# Patient Record
Sex: FEMALE | Race: WHITE | Employment: FULL TIME | URBAN - METROPOLITAN AREA
[De-identification: names, ages, dates, MRNs, and addresses within clinical notes are randomized per-mention and may not be internally consistent; named-entity substitution may affect disease eponyms.]

---

## 2017-01-21 ENCOUNTER — HOSPITAL ENCOUNTER (OUTPATIENT)
Dept: MAMMOGRAPHY | Age: 52
Discharge: HOME | End: 2017-01-21
Attending: OBSTETRICS & GYNECOLOGY
Payer: COMMERCIAL

## 2017-01-21 DIAGNOSIS — Z12.31 VISIT FOR SCREENING MAMMOGRAM: ICD-10-CM

## 2017-01-21 PROCEDURE — 77067 SCR MAMMO BI INCL CAD: CPT

## 2017-01-26 ENCOUNTER — OFFICE VISIT (OUTPATIENT)
Dept: NUTRITION/OBESITY MEDICINE | Facility: HOSPITAL | Age: 52
End: 2017-01-26
Attending: INTERNAL MEDICINE
Payer: COMMERCIAL

## 2017-01-26 VITALS
WEIGHT: 166.25 LBS | HEIGHT: 63 IN | DIASTOLIC BLOOD PRESSURE: 2 MMHG | BODY MASS INDEX: 29.46 KG/M2 | SYSTOLIC BLOOD PRESSURE: 122 MMHG

## 2017-01-26 DIAGNOSIS — E66.9 OBESITY (BMI 30-39.9): ICD-10-CM

## 2017-01-26 DIAGNOSIS — E53.8 LOW VITAMIN B12 LEVEL: ICD-10-CM

## 2017-01-26 DIAGNOSIS — E78.2 MIXED HYPERCHOLESTEROLEMIA AND HYPERTRIGLYCERIDEMIA: Primary | ICD-10-CM

## 2017-01-26 DIAGNOSIS — E55.9 VITAMIN D DEFICIENCY: ICD-10-CM

## 2017-01-26 DIAGNOSIS — Z51.81 ENCOUNTER FOR THERAPEUTIC DRUG MONITORING: ICD-10-CM

## 2017-01-26 DIAGNOSIS — R06.83 SNORING: ICD-10-CM

## 2017-01-26 PROCEDURE — 99214 OFFICE O/P EST MOD 30 MIN: CPT | Performed by: INTERNAL MEDICINE

## 2017-01-26 NOTE — PROGRESS NOTES
[address] [place] [address] AND WEIGHT LOSS CLINIC  [address] [place] [address]  Dept: [phone]  Loc: [phone]     Date:   2016    Patient:  Remi Kirk  :      3/16/1965  MRN:      [id number]    Chief 7/5/2016    REDUCTION LEFT      Comment 1982    REDUCTION RIGHT      Comment 1982    JASON BIOPSY STEREOTACTIC NODULE 2 SITE BILAT      Comment 2001     Family History:    Family History   Problem Relation Age of Onset   • Hypertension Brother        Food Bruce Craig General appearance: alert, appears stated age and cooperative  Head: Normocephalic, without obvious abnormality, atraumatic  Eyes: conjunctivae/corneas clear. PERRL, EOM's intact. Fundi benign. Back: symmetric, no curvature.  ROM normal. No CVA tendernes ounces of non-caloric beverages per day. No fruit juices or regular soda. 3. Increase activity-upper body exercises, walk 10 minutes per day. 4. Increase fruit and vegetable servings to 5-6 per day.       Appetite and energy levels improving with Vyvanse

## 2017-02-08 ENCOUNTER — TELEPHONE (OUTPATIENT)
Dept: SURGERY | Facility: CLINIC | Age: 52
End: 2017-02-08

## 2017-02-08 NOTE — TELEPHONE ENCOUNTER
Patient is taking Vyvanse and will be applying for a job with the [employer].  Will Vyvanse affect her drug test? Please advise

## 2017-03-07 ENCOUNTER — OFFICE VISIT (OUTPATIENT)
Dept: SURGERY | Facility: CLINIC | Age: 52
End: 2017-03-07
Attending: INTERNAL MEDICINE

## 2017-03-07 VITALS
DIASTOLIC BLOOD PRESSURE: 80 MMHG | HEIGHT: 62 IN | HEART RATE: 87 BPM | OXYGEN SATURATION: 97 % | BODY MASS INDEX: 30.61 KG/M2 | RESPIRATION RATE: 18 BRPM | SYSTOLIC BLOOD PRESSURE: 125 MMHG | WEIGHT: 166.31 LBS | TEMPERATURE: 99 F

## 2017-03-07 DIAGNOSIS — E55.9 VITAMIN D DEFICIENCY: ICD-10-CM

## 2017-03-07 DIAGNOSIS — E53.8 LOW VITAMIN B12 LEVEL: ICD-10-CM

## 2017-03-07 DIAGNOSIS — Z51.81 ENCOUNTER FOR THERAPEUTIC DRUG MONITORING: ICD-10-CM

## 2017-03-07 DIAGNOSIS — E66.9 OBESITY (BMI 30-39.9): ICD-10-CM

## 2017-03-07 DIAGNOSIS — E78.2 MIXED HYPERCHOLESTEROLEMIA AND HYPERTRIGLYCERIDEMIA: Primary | ICD-10-CM

## 2017-03-07 PROCEDURE — 99214 OFFICE O/P EST MOD 30 MIN: CPT | Performed by: INTERNAL MEDICINE

## 2017-03-07 NOTE — PROGRESS NOTES
[address] [place] [address] AND WEIGHT LOSS CLINIC  [address] [place] [address]  Dept: [phone]  Loc: [phone]     Date:   2016    Patient:  Bennie Mittal  :      3/16/1965  MRN:      [id number]    Chief REDUCTION LEFT      Comment 1982    REDUCTION RIGHT      Comment 1982    JASON BIOPSY STEREOTACTIC NODULE 2 SITE BILAT      Comment 2001     Family History:    Family History   Problem Relation Age of Onset   • Hypertension Brother        Food [employer]  · Rev appearance: alert, appears stated age and cooperative  Head: Normocephalic, without obvious abnormality, atraumatic  Eyes: conjunctivae/corneas clear. PERRL, EOM's intact. Fundi benign. Back: symmetric, no curvature. ROM normal. No CVA tenderness.   Lungs: beverages per day. No fruit juices or regular soda. 3. Increase activity-upper body exercises, walk 10 minutes per day. 4. Increase fruit and vegetable servings to 5-6 per day.       Appetite and energy levels improving with Vyvanse  Will continue Vyvanse

## 2017-03-18 ENCOUNTER — OFFICE VISIT (OUTPATIENT)
Dept: OTOLARYNGOLOGY | Facility: CLINIC | Age: 52
End: 2017-03-18

## 2017-03-18 VITALS — DIASTOLIC BLOOD PRESSURE: 80 MMHG | SYSTOLIC BLOOD PRESSURE: 110 MMHG | TEMPERATURE: 99 F

## 2017-03-18 DIAGNOSIS — H61.21 IMPACTED CERUMEN OF RIGHT EAR: ICD-10-CM

## 2017-03-18 DIAGNOSIS — E04.9 GOITER: Primary | ICD-10-CM

## 2017-03-18 PROCEDURE — 69210 REMOVE IMPACTED EAR WAX UNI: CPT | Performed by: OTOLARYNGOLOGY

## 2017-03-18 PROCEDURE — 99213 OFFICE O/P EST LOW 20 MIN: CPT | Performed by: OTOLARYNGOLOGY

## 2017-03-18 RX ORDER — LISDEXAMFETAMINE DIMESYLATE 20 MG/1
CAPSULE ORAL
COMMUNITY
Start: 2016-12-27 | End: 2017-04-27 | Stop reason: DRUGHIGH

## 2017-03-18 NOTE — PROGRESS NOTES
Nikita Nava is a 45year old female. Patient presents with:  Ear Problem: Possible perforation R eardrum. Pt reports pain and difficulty hearing R ear.         HISTORY OF PRESENT ILLNESS  3/18/2017    Here for evaluation of a plugged ear on the right RIGHT      Comment 1982    [facility] BIOPSY STEREOTACTIC NODULE 2 SITE BILAT      Comment 2001         REVIEW OF SYSTEMS    System Neg/Pos Details   Constitutional Negative fever, weight loss.    ENMT Negative Headaches vertigo    Eyes Negative Blurred vision and All questions were answered.       Current outpatient prescriptions:   •  VYVANSE 20 MG Oral Cap, , Disp: , Rfl:   •  [START ON 4/7/2017] Lisdexamfetamine Dimesylate (VYVANSE) 30 MG Oral Cap, Take 1 capsule (30 mg total) by mouth every morning., Disp: 30 c

## 2017-03-18 NOTE — PATIENT INSTRUCTIONS
Earwax Removal    The ear canal makes earwax from the canal’s lining. The ears make wax to lubricate and protect the ear canal. The ear canal is the tube that connects the middle ear to the outside of the ear.  The wax protects the ear from bacteria, infe · Don’t use cotton swabs in your ears. Cotton swabs may push wax deeper into the ear canal or damage the eardrum.  Use cotton gauze or a wet washcloth  to gently remove wax on the outside of the ear and around the opening to the ear canal.  · Don't use any © 5695-4561 [address], [address]. All rights reserved. This information is not intended as a substitute for professional medical care. Always follow your healthcare professional's instructions.

## 2017-04-01 ENCOUNTER — HOSPITAL ENCOUNTER (OUTPATIENT)
Dept: ULTRASOUND IMAGING | Age: 52
Discharge: HOME | End: 2017-04-01
Attending: OTOLARYNGOLOGY
Payer: COMMERCIAL

## 2017-04-01 DIAGNOSIS — E04.9 GOITER: ICD-10-CM

## 2017-04-01 PROCEDURE — 76536 US EXAM OF HEAD AND NECK: CPT

## 2017-04-27 ENCOUNTER — OFFICE VISIT (OUTPATIENT)
Dept: SURGERY | Facility: CLINIC | Age: 52
End: 2017-04-27

## 2017-04-27 VITALS
SYSTOLIC BLOOD PRESSURE: 122 MMHG | HEIGHT: 62 IN | DIASTOLIC BLOOD PRESSURE: 70 MMHG | BODY MASS INDEX: 30.09 KG/M2 | RESPIRATION RATE: 18 BRPM | WEIGHT: 163.5 LBS

## 2017-04-27 DIAGNOSIS — E55.9 VITAMIN D DEFICIENCY: ICD-10-CM

## 2017-04-27 DIAGNOSIS — E53.8 LOW VITAMIN B12 LEVEL: ICD-10-CM

## 2017-04-27 DIAGNOSIS — E78.2 MIXED HYPERCHOLESTEROLEMIA AND HYPERTRIGLYCERIDEMIA: Primary | ICD-10-CM

## 2017-04-27 DIAGNOSIS — Z51.81 ENCOUNTER FOR THERAPEUTIC DRUG MONITORING: ICD-10-CM

## 2017-04-27 DIAGNOSIS — E66.3 OVERWEIGHT (BMI 25.0-29.9): ICD-10-CM

## 2017-04-27 PROCEDURE — 99214 OFFICE O/P EST MOD 30 MIN: CPT | Performed by: INTERNAL MEDICINE

## 2017-04-27 NOTE — PROGRESS NOTES
[address] [facility] AND [facility] LOSS CLINIC  [address] [place] [address]  Dept: [phone]  Loc: [phone]     Date:   2016    Patient:  Trenton Ramirez  :      3/16/1965  MRN:      [id number]    Chief INCISION AND DRAINAGE  7/5/2016    REDUCTION LEFT      Comment 1982    REDUCTION RIGHT      Comment 1982    JASON BIOPSY STEREOTACTIC NODULE 2 SITE BILAT      Comment 2001     Family History:    Family History   Problem Relation Age of Onset   • Hypertension negative    Physical Exam:   General appearance: alert, appears stated age and cooperative  Head: Normocephalic, without obvious abnormality, atraumatic  Eyes: conjunctivae/corneas clear. PERRL, EOM's intact. Fundi benign. Back: symmetric, no curvature.  R log.  2. Drink 48-64 ounces of non-caloric beverages per day. No fruit juices or regular soda. 3. Increase activity-upper body exercises, walk 10 minutes per day. 4. Increase fruit and vegetable servings to 5-6 per day.       Appetite and energy levels im

## 2017-05-13 ENCOUNTER — OFFICE VISIT (OUTPATIENT)
Dept: INTERNAL MEDICINE CLINIC | Facility: CLINIC | Age: 52
End: 2017-05-13

## 2017-05-13 VITALS
WEIGHT: 164 LBS | HEIGHT: 62 IN | TEMPERATURE: 98 F | SYSTOLIC BLOOD PRESSURE: 142 MMHG | DIASTOLIC BLOOD PRESSURE: 88 MMHG | HEART RATE: 97 BPM | BODY MASS INDEX: 30.18 KG/M2

## 2017-05-13 DIAGNOSIS — J01.90 ACUTE NON-RECURRENT SINUSITIS, UNSPECIFIED LOCATION: Primary | ICD-10-CM

## 2017-05-13 PROCEDURE — 99213 OFFICE O/P EST LOW 20 MIN: CPT | Performed by: INTERNAL MEDICINE

## 2017-05-13 PROCEDURE — 99212 OFFICE O/P EST SF 10 MIN: CPT | Performed by: INTERNAL MEDICINE

## 2017-05-13 RX ORDER — FLUTICASONE PROPIONATE 50 MCG
2 SPRAY, SUSPENSION (ML) NASAL DAILY
Qty: 1 BOTTLE | Refills: 3 | Status: SHIPPED | OUTPATIENT
Start: 2017-05-13 | End: 2017-10-09 | Stop reason: ALTCHOICE

## 2017-05-13 RX ORDER — AMOXICILLIN 875 MG/1
875 TABLET, COATED ORAL 2 TIMES DAILY
Qty: 20 TABLET | Refills: 0 | Status: SHIPPED | OUTPATIENT
Start: 2017-05-13 | End: 2017-05-23

## 2017-05-13 NOTE — PROGRESS NOTES
HPI:    Patient ID: Bennie Mittal is a 45year old female. Sinus Problem  This is a new problem. The current episode started 1 to 4 weeks ago. There has been no fever. The pain is moderate.  Associated symptoms include congestion, coughing, sinus pr (74.39 kg)         Body mass index is 29.99 kg/(m^2). \  PHYSICAL EXAM:   Physical Exam   Constitutional: She appears well-developed. No distress. HENT:   Head: Normocephalic.    Right Ear: External ear normal.   Left Ear: External ear normal.   Maria D

## 2017-06-22 ENCOUNTER — OFFICE VISIT (OUTPATIENT)
Dept: SURGERY | Facility: CLINIC | Age: 52
End: 2017-06-22

## 2017-06-22 VITALS
HEIGHT: 62 IN | SYSTOLIC BLOOD PRESSURE: 128 MMHG | DIASTOLIC BLOOD PRESSURE: 80 MMHG | WEIGHT: 165.31 LBS | BODY MASS INDEX: 30.42 KG/M2

## 2017-06-22 DIAGNOSIS — E55.9 VITAMIN D DEFICIENCY: ICD-10-CM

## 2017-06-22 DIAGNOSIS — Z51.81 ENCOUNTER FOR THERAPEUTIC DRUG MONITORING: ICD-10-CM

## 2017-06-22 DIAGNOSIS — E53.8 LOW VITAMIN B12 LEVEL: ICD-10-CM

## 2017-06-22 DIAGNOSIS — E66.9 OBESITY (BMI 30-39.9): ICD-10-CM

## 2017-06-22 DIAGNOSIS — E78.2 MIXED HYPERCHOLESTEROLEMIA AND HYPERTRIGLYCERIDEMIA: Primary | ICD-10-CM

## 2017-06-22 PROCEDURE — 99214 OFFICE O/P EST MOD 30 MIN: CPT | Performed by: INTERNAL MEDICINE

## 2017-06-22 NOTE — PROGRESS NOTES
[address] [facility] AND [facility] CLINIC  [address] [place] [address]  Dept: [phone]  Loc: [phone]     Date:   2016    Patient:  Naman Montaño  :      3/16/1965  MRN:      [id number]    Chief Concern Yes    Comment: Coffee, 2 cups per day      Social History Narrative     Surgical History:        Past Surgical History    INCISION AND DRAINAGE  7/5/2016    REDUCTION LEFT      Comment [address] [place] [address] BIOPSY STEREO negative  Musculoskeletal:negative  Neurological: negative  Behavioral/Psych: negative  Endocrine: negative  All other systems were reviewed and are negative    Physical Exam:   General appearance: alert, appears stated age and cooperative  Head: Normoceph warranted. Further consideration for obtaining the sleep study will be discussed with the patient's PCP. Goals for next month:  1. Keep a food log. 2. Drink 48-64 ounces of non-caloric beverages per day. No fruit juices or regular soda.   3. Increase act

## 2017-08-23 ENCOUNTER — OFFICE VISIT (OUTPATIENT)
Dept: SURGERY | Facility: CLINIC | Age: 52
End: 2017-08-23

## 2017-08-23 VITALS
BODY MASS INDEX: 30.36 KG/M2 | SYSTOLIC BLOOD PRESSURE: 130 MMHG | HEIGHT: 62 IN | DIASTOLIC BLOOD PRESSURE: 80 MMHG | WEIGHT: 165 LBS

## 2017-08-23 DIAGNOSIS — Z51.81 ENCOUNTER FOR THERAPEUTIC DRUG MONITORING: ICD-10-CM

## 2017-08-23 DIAGNOSIS — E66.9 OBESITY (BMI 30-39.9): ICD-10-CM

## 2017-08-23 DIAGNOSIS — R06.83 SNORING: Primary | ICD-10-CM

## 2017-08-23 DIAGNOSIS — E55.9 VITAMIN D DEFICIENCY: ICD-10-CM

## 2017-08-23 PROCEDURE — 99214 OFFICE O/P EST MOD 30 MIN: CPT | Performed by: INTERNAL MEDICINE

## 2017-08-23 RX ORDER — HYDROCHLOROTHIAZIDE 12.5 MG/1
12.5 CAPSULE, GELATIN COATED ORAL DAILY
Qty: 30 CAPSULE | Refills: 1 | Status: SHIPPED | OUTPATIENT
Start: 2017-08-23 | End: 2017-10-09 | Stop reason: ALTCHOICE

## 2017-08-23 RX ORDER — LISDEXAMFETAMINE DIMESYLATE 40 MG/1
CAPSULE ORAL
COMMUNITY
Start: 2017-07-22 | End: 2017-10-09 | Stop reason: ALTCHOICE

## 2017-08-23 NOTE — PROGRESS NOTES
[address] [facility] AND [facility] LOSS CLINIC  [address] [place] [address]  Dept: [phone]  Loc: [phone]     Date:   2016    Patient:  Snehal Aguirre  :      3/16/1965  MRN:      [id number]    Chief Surgical History:  7/5/2016: INCISION AND DRAINAGE  No date: JASON BIOPSY STEREOTACTIC NODULE 2 SITE BILAT      Comment: 2001  No date: REDUCTION LEFT      Comment: 1982  No date: REDUCTION RIGHT      Comment: 1982  Family History:    Family History   Proble other systems were reviewed and are negative    Physical Exam:   General appearance: alert, appears stated age and cooperative  Head: Normocephalic, without obvious abnormality, atraumatic  Eyes: conjunctivae/corneas clear. PERRL, EOM's intact.  Fundi benig ounces of non-caloric beverages per day. No fruit juices or regular soda. 3. Increase activity-upper body exercises, walk 10 minutes per day. 4. Increase fruit and vegetable servings to 5-6 per day.       Appetite and energy levels improving with Vyvanse

## 2017-10-09 ENCOUNTER — OFFICE VISIT (OUTPATIENT)
Dept: SURGERY | Facility: CLINIC | Age: 52
End: 2017-10-09

## 2017-10-09 VITALS
WEIGHT: 165.31 LBS | SYSTOLIC BLOOD PRESSURE: 140 MMHG | BODY MASS INDEX: 30.42 KG/M2 | DIASTOLIC BLOOD PRESSURE: 78 MMHG | HEIGHT: 62 IN

## 2017-10-09 DIAGNOSIS — R53.83 OTHER FATIGUE: ICD-10-CM

## 2017-10-09 DIAGNOSIS — R63.2 BINGE EATING: Primary | ICD-10-CM

## 2017-10-09 DIAGNOSIS — E66.9 OBESITY (BMI 30-39.9): ICD-10-CM

## 2017-10-09 DIAGNOSIS — Z51.81 ENCOUNTER FOR THERAPEUTIC DRUG MONITORING: ICD-10-CM

## 2017-10-09 DIAGNOSIS — F43.9 STRESS: ICD-10-CM

## 2017-10-09 DIAGNOSIS — E78.2 MIXED HYPERCHOLESTEROLEMIA AND HYPERTRIGLYCERIDEMIA: ICD-10-CM

## 2017-10-09 PROCEDURE — 99214 OFFICE O/P EST MOD 30 MIN: CPT | Performed by: INTERNAL MEDICINE

## 2017-10-09 NOTE — PROGRESS NOTES
[address] [facility] AND WEIGHT LOSS CLINIC  [address] [place] [address]  Dept: [phone]  Loc: [phone]     Date:   10/9/17    Patient:  Nikita Nava  :      3/16/1965  MRN:      [id number]    Chief Co tobacco: Not on file    Alcohol use Yes  0.0 oz/week     Comment: Socially    Drug use: Unknown     Other Topics Concern    Caffeine Concern Yes    Comment: Coffee, 2 cups per day      Social History Narrative   None on file     Surgical History:    Past S and Discussed    Walk for  45 Minutes and Treadmill for  45 Minutes    ROS:    Constitutional: negative  Respiratory: negative  Cardiovascular: negative  Gastrointestinal: negative  Musculoskeletal:negative  Neurological: negative  Behavioral/Psych: negati Drink 48-64 ounces of non-caloric beverages per day. No fruit juices or regular soda. 3. Increase activity-upper body exercises, walk 10 minutes per day. 4. Increase fruit and vegetable servings to 5-6 per day.       Appetite and energy levels improving w

## 2017-11-07 ENCOUNTER — OFFICE VISIT (OUTPATIENT)
Dept: INTERNAL MEDICINE CLINIC | Facility: CLINIC | Age: 52
End: 2017-11-07

## 2017-11-07 VITALS
DIASTOLIC BLOOD PRESSURE: 104 MMHG | HEART RATE: 118 BPM | WEIGHT: 164.31 LBS | TEMPERATURE: 99 F | BODY MASS INDEX: 30 KG/M2 | SYSTOLIC BLOOD PRESSURE: 146 MMHG

## 2017-11-07 DIAGNOSIS — J02.9 PHARYNGITIS, UNSPECIFIED ETIOLOGY: Primary | ICD-10-CM

## 2017-11-07 PROCEDURE — 99213 OFFICE O/P EST LOW 20 MIN: CPT | Performed by: INTERNAL MEDICINE

## 2017-11-07 PROCEDURE — 99214 OFFICE O/P EST MOD 30 MIN: CPT | Performed by: INTERNAL MEDICINE

## 2017-11-07 PROCEDURE — 87880 STREP A ASSAY W/OPTIC: CPT | Performed by: INTERNAL MEDICINE

## 2017-11-07 NOTE — PROGRESS NOTES
HPI:    Patient ID: Andreia Banks is a 45year old female. Sore Throat    This is a new problem. The current episode started yesterday. There has been no fever. Pain severity now: Kept the pt up last night.  Associated symptoms include coughing (dry distress. HENT:   Head: Normocephalic and atraumatic.    Right Ear: Tympanic membrane, external ear and ear canal normal.   Left Ear: Tympanic membrane, external ear and ear canal normal.   Mouth/Throat: Oropharynx is clear and moist. No oropharyngeal exu applicable) and agree that the record reflects my personal performance and is accurate and complete.   Alley Alonso MD, 11/7/2017, 1:09 PM

## 2017-12-11 ENCOUNTER — OFFICE VISIT (OUTPATIENT)
Dept: SURGERY | Facility: CLINIC | Age: 52
End: 2017-12-11

## 2017-12-11 VITALS
OXYGEN SATURATION: 98 % | TEMPERATURE: 98 F | DIASTOLIC BLOOD PRESSURE: 84 MMHG | BODY MASS INDEX: 30.58 KG/M2 | RESPIRATION RATE: 18 BRPM | HEART RATE: 97 BPM | WEIGHT: 166.19 LBS | SYSTOLIC BLOOD PRESSURE: 120 MMHG | HEIGHT: 62 IN

## 2017-12-11 DIAGNOSIS — F43.9 STRESS: ICD-10-CM

## 2017-12-11 DIAGNOSIS — Z51.81 ENCOUNTER FOR THERAPEUTIC DRUG MONITORING: ICD-10-CM

## 2017-12-11 DIAGNOSIS — R06.83 SNORING: ICD-10-CM

## 2017-12-11 DIAGNOSIS — E66.3 OVERWEIGHT (BMI 25.0-29.9): ICD-10-CM

## 2017-12-11 DIAGNOSIS — E78.2 MIXED HYPERCHOLESTEROLEMIA AND HYPERTRIGLYCERIDEMIA: Primary | ICD-10-CM

## 2017-12-11 DIAGNOSIS — E55.9 VITAMIN D DEFICIENCY: ICD-10-CM

## 2017-12-11 PROCEDURE — 99214 OFFICE O/P EST MOD 30 MIN: CPT | Performed by: INTERNAL MEDICINE

## 2017-12-11 NOTE — PROGRESS NOTES
[address] [facility] AND [facility] LOSS CLIN[facility]  [address] [place] [address]  Dept: [phone]  Loc: [phone]     Date:   10/9/17    Patient:  Db Ruby  :      3/16/1965  MRN:      [id number]    Chief Co INCISION AND DRAINAGE  No date: JASON BIOPSY STEREOTACTIC NODULE 2 SITE BILAT      Comment: 2001  No date: REDUCTION LEFT      Comment: 1982  No date: REDUCTION RIGHT      Comment: 16  Family History:    Family History   Problem Relation Age of Onset   • H and are negative    Physical Exam:   General appearance: alert, appears stated age and cooperative  Head: Normocephalic, without obvious abnormality, atraumatic  Eyes: conjunctivae/corneas clear. PERRL, EOM's intact. Fundi benign.   Back: symmetric, no curv mg every other day    Feels that her clothes are fitting better    Patient is frustrated with weight loss    Recommended seeing Psychologist for stress/worry  Does not want to start meds  Admits to not sleeping well    Needs to work on sleep hygiene    Car

## 2018-01-27 ENCOUNTER — HOSPITAL ENCOUNTER (OUTPATIENT)
Dept: MAMMOGRAPHY | Age: 53
Discharge: HOME | End: 2018-01-27
Attending: OBSTETRICS & GYNECOLOGY
Payer: COMMERCIAL

## 2018-01-27 DIAGNOSIS — Z12.31 ENCOUNTER FOR SCREENING MAMMOGRAM FOR MALIGNANT NEOPLASM OF BREAST: ICD-10-CM

## 2018-01-27 PROCEDURE — 77067 SCR MAMMO BI INCL CAD: CPT | Performed by: OBSTETRICS & GYNECOLOGY

## 2019-02-09 ENCOUNTER — HOSPITAL ENCOUNTER (OUTPATIENT)
Dept: MAMMOGRAPHY | Age: 54
Discharge: HOME | End: 2019-02-09
Attending: OBSTETRICS & GYNECOLOGY
Payer: COMMERCIAL

## 2019-02-09 DIAGNOSIS — Z12.39 ENCOUNTER FOR SCREENING FOR MALIGNANT NEOPLASM OF BREAST: ICD-10-CM

## 2019-02-09 PROCEDURE — 77063 BREAST TOMOSYNTHESIS BI: CPT | Performed by: OBSTETRICS & GYNECOLOGY

## 2019-02-09 PROCEDURE — 77067 SCR MAMMO BI INCL CAD: CPT | Performed by: OBSTETRICS & GYNECOLOGY

## 2020-03-01 ENCOUNTER — HOSPITAL ENCOUNTER (OUTPATIENT)
Dept: MAMMOGRAPHY | Facility: HOSPITAL | Age: 55
Discharge: HOME | End: 2020-03-01
Attending: OBSTETRICS & GYNECOLOGY
Payer: COMMERCIAL

## 2020-03-01 DIAGNOSIS — Z12.31 ENCOUNTER FOR SCREENING MAMMOGRAM FOR MALIGNANT NEOPLASM OF BREAST: ICD-10-CM

## 2020-03-01 PROCEDURE — 77067 SCR MAMMO BI INCL CAD: CPT | Performed by: OBSTETRICS & GYNECOLOGY

## 2020-03-01 PROCEDURE — 77063 BREAST TOMOSYNTHESIS BI: CPT | Performed by: OBSTETRICS & GYNECOLOGY

## 2021-03-03 ENCOUNTER — HOSPITAL ENCOUNTER (OUTPATIENT)
Dept: MAMMOGRAPHY | Age: 56
Discharge: HOME | End: 2021-03-03
Attending: OBSTETRICS & GYNECOLOGY
Payer: COMMERCIAL

## 2021-03-03 DIAGNOSIS — Z12.31 ENCOUNTER FOR SCREENING MAMMOGRAM FOR MALIGNANT NEOPLASM OF BREAST: ICD-10-CM

## 2021-03-03 PROCEDURE — 77063 BREAST TOMOSYNTHESIS BI: CPT | Performed by: OBSTETRICS & GYNECOLOGY

## 2021-03-03 PROCEDURE — 77067 SCR MAMMO BI INCL CAD: CPT | Performed by: OBSTETRICS & GYNECOLOGY

## 2021-09-21 ENCOUNTER — TELEPHONE (OUTPATIENT)
Dept: SURGERY | Facility: CLINIC | Age: 56
End: 2021-09-21

## 2022-02-17 ENCOUNTER — LAB REQUISITION (OUTPATIENT)
Dept: LAB | Facility: HOSPITAL | Age: 57
End: 2022-02-17
Payer: COMMERCIAL

## 2022-02-17 PROCEDURE — 87624 HPV HI-RISK TYP POOLED RSLT: CPT | Performed by: OBSTETRICS & GYNECOLOGY

## 2022-02-17 PROCEDURE — 88175 CYTOPATH C/V AUTO FLUID REDO: CPT | Performed by: OBSTETRICS & GYNECOLOGY

## 2022-02-18 LAB — HPV I/H RISK 1 DNA SPEC QL NAA+PROBE: NEGATIVE

## 2022-03-12 ENCOUNTER — HOSPITAL ENCOUNTER (OUTPATIENT)
Dept: MAMMOGRAPHY | Age: 57
Discharge: HOME | End: 2022-03-12
Attending: OBSTETRICS & GYNECOLOGY
Payer: COMMERCIAL

## 2022-03-12 DIAGNOSIS — Z12.31 ENCOUNTER FOR SCREENING MAMMOGRAM FOR MALIGNANT NEOPLASM OF BREAST: ICD-10-CM

## 2022-03-12 PROCEDURE — 77063 BREAST TOMOSYNTHESIS BI: CPT | Performed by: OBSTETRICS & GYNECOLOGY

## 2022-03-12 PROCEDURE — 77067 SCR MAMMO BI INCL CAD: CPT | Performed by: OBSTETRICS & GYNECOLOGY

## 2023-01-02 ENCOUNTER — HOSPITAL ENCOUNTER (OUTPATIENT)
Age: 58
Discharge: HOME | End: 2023-01-02
Payer: COMMERCIAL

## 2023-01-02 VITALS
DIASTOLIC BLOOD PRESSURE: 88 MMHG | HEART RATE: 89 BPM | RESPIRATION RATE: 18 BRPM | SYSTOLIC BLOOD PRESSURE: 148 MMHG | BODY MASS INDEX: 27.6 KG/M2 | HEIGHT: 62 IN | OXYGEN SATURATION: 99 % | WEIGHT: 150 LBS | TEMPERATURE: 98 F

## 2023-01-02 DIAGNOSIS — N30.01 ACUTE CYSTITIS WITH HEMATURIA: Primary | ICD-10-CM

## 2023-01-02 LAB
BILIRUB UR QL STRIP: NEGATIVE
COLOR UR: YELLOW
GLUCOSE UR STRIP-MCNC: NEGATIVE MG/DL
KETONES UR STRIP-MCNC: NEGATIVE MG/DL
NITRITE UR QL STRIP: NEGATIVE
PH UR STRIP: 6.5 [PH]
PROT UR STRIP-MCNC: NEGATIVE MG/DL
SP GR UR STRIP: 1.01
UROBILINOGEN UR STRIP-ACNC: <2 MG/DL

## 2023-01-02 PROCEDURE — 99203 OFFICE O/P NEW LOW 30 MIN: CPT

## 2023-01-02 PROCEDURE — 81002 URINALYSIS NONAUTO W/O SCOPE: CPT

## 2023-01-02 RX ORDER — ROSUVASTATIN CALCIUM 20 MG/1
TABLET, COATED ORAL
COMMUNITY
Start: 2022-12-05

## 2023-01-02 RX ORDER — CEPHALEXIN 500 MG/1
500 CAPSULE ORAL 2 TIMES DAILY
Qty: 20 CAPSULE | Refills: 0 | Status: SHIPPED | OUTPATIENT
Start: 2023-01-02 | End: 2023-01-04 | Stop reason: ALTCHOICE

## 2023-01-02 NOTE — DISCHARGE INSTRUCTIONS
Your urine showed signs of a urinary tract infection. Please take the antibiotics as prescribed. We will send the urine for culture and call you in 2 days if the antibiotic needs to change. Drink lots of fluids. Take Tylenol or Motrin for pain or fever. You can take a probiotic to prevent yeast infections. If you develop any abdominal pain, back pain, vomiting or any other concerning complaints you should go to the emergency department. Otherwise follow-up with your primary care doctor.

## 2023-01-03 LAB
C TRACH DNA SPEC QL NAA+PROBE: NEGATIVE
N GONORRHOEA DNA SPEC QL NAA+PROBE: NEGATIVE

## 2023-01-04 ENCOUNTER — PATIENT MESSAGE (OUTPATIENT)
Dept: INTERNAL MEDICINE CLINIC | Facility: CLINIC | Age: 58
End: 2023-01-04

## 2023-01-04 ENCOUNTER — PATIENT MESSAGE (OUTPATIENT)
Dept: OBGYN CLINIC | Facility: CLINIC | Age: 58
End: 2023-01-04

## 2023-01-04 RX ORDER — NITROFURANTOIN 25; 75 MG/1; MG/1
100 CAPSULE ORAL 2 TIMES DAILY
Qty: 14 CAPSULE | Refills: 0 | Status: SHIPPED | OUTPATIENT
Start: 2023-01-04 | End: 2023-01-11

## 2023-01-04 NOTE — TELEPHONE ENCOUNTER
From: Diane Whipple  To: Anika Burks MD  Sent: 1/4/2023 1:55 PM CST  Subject: Question regarding URINE CULTURE, ROUTINE    Do I need to change my Rx?

## 2023-01-05 NOTE — TELEPHONE ENCOUNTER
Please find out what antibiotic she is actually taking. Initially given keflex then switched to nitrofurantoin but not sure if she was made aware of the switch.

## 2023-01-05 NOTE — TELEPHONE ENCOUNTER
From: Andrew Miles  To: Jan Mcfadden MD  Sent: 1/4/2023 1:55 PM CST  Subject: Question regarding URINE CULTURE, ROUTINE    Do I need to change my Rx?
It looks like the ordering provider already reviewed the culture results and switched her to Suzanna Garcia. Please be sure that she saw that change.
Pt was seen at [facility] for UTI symptoms on 1/2. Pt was prescribed cephalexin. Message to [facility] please [facility] urine culture and if RX needs to be changed?
dependent (less than 25% patients effort)

## 2023-02-20 ENCOUNTER — OFFICE VISIT (OUTPATIENT)
Dept: OBGYN CLINIC | Facility: CLINIC | Age: 58
End: 2023-02-20

## 2023-02-20 VITALS
HEART RATE: 89 BPM | WEIGHT: 150.19 LBS | BODY MASS INDEX: 28.35 KG/M2 | HEIGHT: 61 IN | DIASTOLIC BLOOD PRESSURE: 78 MMHG | SYSTOLIC BLOOD PRESSURE: 116 MMHG

## 2023-02-20 DIAGNOSIS — Z12.31 ENCOUNTER FOR SCREENING MAMMOGRAM FOR MALIGNANT NEOPLASM OF BREAST: ICD-10-CM

## 2023-02-20 DIAGNOSIS — Z12.4 SCREENING FOR MALIGNANT NEOPLASM OF CERVIX: ICD-10-CM

## 2023-02-20 DIAGNOSIS — Z01.419 WELL WOMAN EXAM WITH ROUTINE GYNECOLOGICAL EXAM: Primary | ICD-10-CM

## 2023-02-20 PROCEDURE — 99386 PREV VISIT NEW AGE 40-64: CPT | Performed by: OBSTETRICS & GYNECOLOGY

## 2023-02-20 PROCEDURE — 3078F DIAST BP <80 MM HG: CPT | Performed by: OBSTETRICS & GYNECOLOGY

## 2023-02-20 PROCEDURE — 3074F SYST BP LT 130 MM HG: CPT | Performed by: OBSTETRICS & GYNECOLOGY

## 2023-02-20 PROCEDURE — 3008F BODY MASS INDEX DOCD: CPT | Performed by: OBSTETRICS & GYNECOLOGY

## 2023-02-20 RX ORDER — LISINOPRIL 30 MG/1
TABLET ORAL
COMMUNITY
Start: 2022-12-20

## 2023-02-21 LAB — HPV I/H RISK 1 DNA SPEC QL NAA+PROBE: NEGATIVE

## 2023-02-22 ENCOUNTER — PATIENT OUTREACH (OUTPATIENT)
Dept: CASE MANAGEMENT | Age: 58
End: 2023-02-22

## 2023-02-22 NOTE — PROCEDURES
The office order for PCP request is Approved and finalized on February 22, 2023.     Thanks,  [facility] [employer]

## 2023-03-03 ENCOUNTER — OFFICE VISIT (OUTPATIENT)
Dept: INTERNAL MEDICINE CLINIC | Facility: CLINIC | Age: 58
End: 2023-03-03

## 2023-03-03 VITALS
HEIGHT: 61 IN | BODY MASS INDEX: 28.7 KG/M2 | WEIGHT: 152 LBS | DIASTOLIC BLOOD PRESSURE: 85 MMHG | SYSTOLIC BLOOD PRESSURE: 127 MMHG | HEART RATE: 76 BPM

## 2023-03-03 DIAGNOSIS — E78.2 MIXED HYPERLIPIDEMIA: ICD-10-CM

## 2023-03-03 DIAGNOSIS — I10 PRIMARY HYPERTENSION: ICD-10-CM

## 2023-03-03 DIAGNOSIS — Z23 NEED FOR VACCINATION: ICD-10-CM

## 2023-03-03 DIAGNOSIS — Z13.6 SCREENING, ISCHEMIC HEART DISEASE: ICD-10-CM

## 2023-03-03 DIAGNOSIS — Z00.00 ANNUAL PHYSICAL EXAM: Primary | ICD-10-CM

## 2023-03-03 DIAGNOSIS — E55.9 VITAMIN D DEFICIENCY: ICD-10-CM

## 2023-03-03 DIAGNOSIS — Z23 NEED FOR TDAP VACCINATION: ICD-10-CM

## 2023-03-03 PROCEDURE — 3074F SYST BP LT 130 MM HG: CPT | Performed by: INTERNAL MEDICINE

## 2023-03-03 PROCEDURE — 90471 IMMUNIZATION ADMIN: CPT | Performed by: INTERNAL MEDICINE

## 2023-03-03 PROCEDURE — 3079F DIAST BP 80-89 MM HG: CPT | Performed by: INTERNAL MEDICINE

## 2023-03-03 PROCEDURE — 99386 PREV VISIT NEW AGE 40-64: CPT | Performed by: INTERNAL MEDICINE

## 2023-03-03 PROCEDURE — 3008F BODY MASS INDEX DOCD: CPT | Performed by: INTERNAL MEDICINE

## 2023-03-03 PROCEDURE — 90715 TDAP VACCINE 7 YRS/> IM: CPT | Performed by: INTERNAL MEDICINE

## 2023-03-03 RX ORDER — LISINOPRIL 30 MG/1
30 TABLET ORAL DAILY
Qty: 90 TABLET | Refills: 9 | Status: SHIPPED | OUTPATIENT
Start: 2023-03-03

## 2023-03-03 RX ORDER — ROSUVASTATIN CALCIUM 20 MG/1
20 TABLET, COATED ORAL NIGHTLY
Qty: 90 TABLET | Refills: 9 | Status: SHIPPED | OUTPATIENT
Start: 2023-03-03

## 2023-03-03 NOTE — PATIENT INSTRUCTIONS
Colette Daugherty the physical therapy guys on youtube- shoulder and neck stretches/exercises. Frozen shoulder. Adhesive capsulitis.    Percussion massager  Sunbeam neck heating pad

## 2023-03-04 PROBLEM — I10 PRIMARY HYPERTENSION: Status: ACTIVE | Noted: 2023-03-04

## 2023-03-04 LAB
ALBUMIN/GLOBULIN RATIO: 1.8 (CALC) (ref 1–2.5)
ALBUMIN: 4.8 G/DL (ref 3.6–5.1)
ALKALINE PHOSPHATASE: 86 U/L (ref 37–153)
ALT: 47 U/L (ref 6–29)
AST: 28 U/L (ref 10–35)
BILIRUBIN, TOTAL: 1.1 MG/DL (ref 0.2–1.2)
BUN: 20 MG/DL (ref 7–25)
CALCIUM: 10.4 MG/DL (ref 8.6–10.4)
CARBON DIOXIDE: 28 MMOL/L (ref 20–32)
CHLORIDE: 104 MMOL/L (ref 98–110)
CHOL/HDLC RATIO: 3.7 (CALC)
CHOLESTEROL, TOTAL: 183 MG/DL
CREATININE: 0.81 MG/DL (ref 0.5–1.03)
EGFR: 84 ML/MIN/1.73M2
GLOBULIN: 2.7 G/DL (CALC) (ref 1.9–3.7)
GLUCOSE: 81 MG/DL (ref 65–99)
HDL CHOLESTEROL: 49 MG/DL
HEMATOCRIT: 41.5 % (ref 35–45)
HEMOGLOBIN A1C: 5.6 % OF TOTAL HGB
HEMOGLOBIN: 14 G/DL (ref 11.7–15.5)
LDL-CHOLESTEROL: 95 MG/DL (CALC)
MCH: 30.9 PG (ref 27–33)
MCHC: 33.7 G/DL (ref 32–36)
MCV: 91.6 FL (ref 80–100)
MPV: 11.2 FL (ref 7.5–12.5)
NON-HDL CHOLESTEROL: 134 MG/DL (CALC)
PLATELET COUNT: 181 THOUSAND/UL (ref 140–400)
POTASSIUM: 4.3 MMOL/L (ref 3.5–5.3)
PROTEIN, TOTAL: 7.5 G/DL (ref 6.1–8.1)
RDW: 11.9 % (ref 11–15)
RED BLOOD CELL COUNT: 4.53 MILLION/UL (ref 3.8–5.1)
SODIUM: 140 MMOL/L (ref 135–146)
TRIGLYCERIDES: 276 MG/DL
TSH W/REFLEX TO FT4: 3.11 MIU/L (ref 0.4–4.5)
VITAMIN D, 25-OH, TOTAL: 29 NG/ML (ref 30–100)
WHITE BLOOD CELL COUNT: 4.6 THOUSAND/UL (ref 3.8–10.8)

## 2023-03-25 ENCOUNTER — HOSPITAL ENCOUNTER (OUTPATIENT)
Dept: MAMMOGRAPHY | Age: 58
Discharge: HOME | End: 2023-03-25
Attending: OBSTETRICS & GYNECOLOGY
Payer: COMMERCIAL

## 2023-03-25 DIAGNOSIS — Z12.31 ENCOUNTER FOR SCREENING MAMMOGRAM FOR MALIGNANT NEOPLASM OF BREAST: ICD-10-CM

## 2023-03-25 PROCEDURE — 77067 SCR MAMMO BI INCL CAD: CPT | Performed by: OBSTETRICS & GYNECOLOGY

## 2023-03-25 PROCEDURE — 77063 BREAST TOMOSYNTHESIS BI: CPT | Performed by: OBSTETRICS & GYNECOLOGY

## 2023-03-26 ENCOUNTER — HOSPITAL ENCOUNTER (OUTPATIENT)
Dept: CT IMAGING | Age: 58
Discharge: HOME | End: 2023-03-26
Attending: INTERNAL MEDICINE

## 2023-03-26 DIAGNOSIS — Z13.6 SCREENING, ISCHEMIC HEART DISEASE: ICD-10-CM

## 2023-04-16 ENCOUNTER — PATIENT MESSAGE (OUTPATIENT)
Dept: OBGYN CLINIC | Facility: CLINIC | Age: 58
End: 2023-04-16

## 2023-04-17 ENCOUNTER — HOSPITAL ENCOUNTER (OUTPATIENT)
Age: 58
Discharge: HOME | End: 2023-04-17
Payer: COMMERCIAL

## 2023-04-17 VITALS
DIASTOLIC BLOOD PRESSURE: 86 MMHG | TEMPERATURE: 98 F | SYSTOLIC BLOOD PRESSURE: 137 MMHG | OXYGEN SATURATION: 100 % | RESPIRATION RATE: 20 BRPM | HEART RATE: 80 BPM

## 2023-04-17 DIAGNOSIS — N30.00 ACUTE CYSTITIS WITHOUT HEMATURIA: Primary | ICD-10-CM

## 2023-04-17 LAB
BILIRUB UR QL STRIP: NEGATIVE
CLARITY UR: CLEAR
COLOR UR: YELLOW
GLUCOSE UR STRIP-MCNC: NEGATIVE MG/DL
KETONES UR STRIP-MCNC: NEGATIVE MG/DL
NITRITE UR QL STRIP: NEGATIVE
PH UR STRIP: 5.5 [PH]
PROT UR STRIP-MCNC: NEGATIVE MG/DL
SP GR UR STRIP: 1.01
UROBILINOGEN UR STRIP-ACNC: <2 MG/DL

## 2023-04-17 PROCEDURE — 99213 OFFICE O/P EST LOW 20 MIN: CPT | Performed by: EMERGENCY MEDICINE

## 2023-04-17 PROCEDURE — 81002 URINALYSIS NONAUTO W/O SCOPE: CPT | Performed by: EMERGENCY MEDICINE

## 2023-04-17 RX ORDER — NITROFURANTOIN 25; 75 MG/1; MG/1
100 CAPSULE ORAL 2 TIMES DAILY
Qty: 14 CAPSULE | Refills: 0 | Status: SHIPPED | OUTPATIENT
Start: 2023-04-17 | End: 2023-04-24

## 2023-04-17 RX ORDER — PHENAZOPYRIDINE HYDROCHLORIDE 100 MG/1
100 TABLET, FILM COATED ORAL 3 TIMES DAILY PRN
Qty: 6 TABLET | Refills: 0 | Status: SHIPPED | OUTPATIENT
Start: 2023-04-17 | End: 2023-04-24

## 2023-04-17 NOTE — DISCHARGE INSTRUCTIONS
We will call you with urine culture results. Sometimes your primary or obgyn may want to put you on a small dose of macrobid for you to take after intercourse.

## 2023-04-17 NOTE — ED INITIAL ASSESSMENT (HPI)
Pt c/o hematuria, painful urination which started last night around 7 pm. Denies fevers. Denies abdominal pain.  At home azo uti test positive

## 2023-05-01 ENCOUNTER — LAB ENCOUNTER (OUTPATIENT)
Dept: LAB | Facility: HOSPITAL | Age: 58
End: 2023-05-01
Attending: OBSTETRICS & GYNECOLOGY
Payer: COMMERCIAL

## 2023-05-01 ENCOUNTER — TELEPHONE (OUTPATIENT)
Dept: OBGYN CLINIC | Facility: CLINIC | Age: 58
End: 2023-05-01

## 2023-05-01 DIAGNOSIS — R31.9 HEMATURIA, UNSPECIFIED TYPE: Primary | ICD-10-CM

## 2023-05-01 DIAGNOSIS — R30.9 PAIN WITH URINATION: ICD-10-CM

## 2023-05-01 LAB
BILIRUB UR QL: NEGATIVE
CLARITY UR: CLEAR
COLOR UR: COLORLESS
GLUCOSE UR-MCNC: NORMAL MG/DL
KETONES UR-MCNC: NEGATIVE MG/DL
LEUKOCYTE ESTERASE UR QL STRIP.AUTO: 500
NITRITE UR QL STRIP.AUTO: NEGATIVE
PH UR: 6 [PH] (ref 5–8)
PROT UR-MCNC: NEGATIVE MG/DL
SP GR UR STRIP: 1.01 (ref 1–1.03)
UROBILINOGEN UR STRIP-ACNC: NORMAL
WBC #/AREA URNS AUTO: >50 /HPF

## 2023-05-01 PROCEDURE — 81001 URINALYSIS AUTO W/SCOPE: CPT | Performed by: OBSTETRICS & GYNECOLOGY

## 2023-05-01 PROCEDURE — 87086 URINE CULTURE/COLONY COUNT: CPT | Performed by: OBSTETRICS & GYNECOLOGY

## 2023-05-01 RX ORDER — SULFAMETHOXAZOLE AND TRIMETHOPRIM 800; 160 MG/1; MG/1
1 TABLET ORAL 2 TIMES DAILY
Qty: 6 TABLET | Refills: 0 | Status: SHIPPED | OUTPATIENT
Start: 2023-05-01 | End: 2023-05-04

## 2023-05-01 NOTE — TELEPHONE ENCOUNTER
Please send Bactrim BID x3 days, but she should start after collection of UA. [phone] [address] Walker for 5/8 appointment.

## 2023-05-01 NOTE — TELEPHONE ENCOUNTER
Pt calling to report that she thinks she has another UTI. Pt stated she has had about 4 UTIs in the last 5 months. Pt stated she did an at home UTI test and it was negative for nitrates but positive for leukocytes. Pt reports pressure/pain with urination and states there is blood in her urine. Asked pt if she thinks blood is the from urethra or vagina and pt stated \" I am postmenopausal so I am not bleeding vaginally anymore\". Explained to pt that it can be hard to tell if she is noticing the blood with wiping if it is coming from vagina or urethra. Pt unable to tell me where blood is coming from. Order placed for UA and pt advised to get this done today and call tomorrow for results. Pt informed it may be best ot be seen for an exam to make sure bleeding is not vaginal. Pt stated she already scheduled an appt with MELBA for 5/8. Message to [facility], [facility] for any other recs or if you want to see pt sooner then 5/8? Pt stated she cannot come in tomorrow.

## 2023-05-01 NOTE — TELEPHONE ENCOUNTER
Pt states she keeps getting reoccurring uti's, took a home test and tested positive, wondering if something can be called in.  Please advise

## 2023-05-01 NOTE — TELEPHONE ENCOUNTER
Notified pt of JMN message. Pt agrees. UA is in process. Advised pt to push water intake. Pharmacy verified and rx sent.

## 2023-05-04 NOTE — TELEPHONE ENCOUNTER
Her urine culture returned negative, but since she was symptomatic she can continue the abx course. Since symptoms have been becoming more recurrent I recommend that she have a consult with urology.

## 2023-05-08 ENCOUNTER — OFFICE VISIT (OUTPATIENT)
Dept: OBGYN CLINIC | Facility: CLINIC | Age: 58
End: 2023-05-08

## 2023-05-08 VITALS
WEIGHT: 155.38 LBS | HEART RATE: 71 BPM | DIASTOLIC BLOOD PRESSURE: 83 MMHG | SYSTOLIC BLOOD PRESSURE: 130 MMHG | BODY MASS INDEX: 29 KG/M2

## 2023-05-08 DIAGNOSIS — N39.0 RECURRENT UTI: Primary | ICD-10-CM

## 2023-05-08 PROCEDURE — 3079F DIAST BP 80-89 MM HG: CPT | Performed by: OBSTETRICS & GYNECOLOGY

## 2023-05-08 PROCEDURE — 3075F SYST BP GE 130 - 139MM HG: CPT | Performed by: OBSTETRICS & GYNECOLOGY

## 2023-05-08 PROCEDURE — 99213 OFFICE O/P EST LOW 20 MIN: CPT | Performed by: OBSTETRICS & GYNECOLOGY

## 2023-05-08 RX ORDER — NITROFURANTOIN 25; 75 MG/1; MG/1
100 CAPSULE ORAL ONCE AS NEEDED
Qty: 30 CAPSULE | Refills: 2 | Status: SHIPPED | OUTPATIENT
Start: 2023-05-08 | End: 2023-05-08

## 2023-06-29 ENCOUNTER — OFFICE VISIT (OUTPATIENT)
Dept: INTERNAL MEDICINE CLINIC | Facility: CLINIC | Age: 58
End: 2023-06-29

## 2023-06-29 VITALS
HEART RATE: 90 BPM | DIASTOLIC BLOOD PRESSURE: 75 MMHG | HEIGHT: 61 IN | BODY MASS INDEX: 29.83 KG/M2 | SYSTOLIC BLOOD PRESSURE: 106 MMHG | WEIGHT: 158 LBS

## 2023-06-29 DIAGNOSIS — E78.2 MIXED HYPERLIPIDEMIA: ICD-10-CM

## 2023-06-29 DIAGNOSIS — I10 PRIMARY HYPERTENSION: ICD-10-CM

## 2023-06-29 PROCEDURE — 3008F BODY MASS INDEX DOCD: CPT | Performed by: INTERNAL MEDICINE

## 2023-06-29 PROCEDURE — 3074F SYST BP LT 130 MM HG: CPT | Performed by: INTERNAL MEDICINE

## 2023-06-29 PROCEDURE — 99214 OFFICE O/P EST MOD 30 MIN: CPT | Performed by: INTERNAL MEDICINE

## 2023-06-29 PROCEDURE — 3078F DIAST BP <80 MM HG: CPT | Performed by: INTERNAL MEDICINE

## 2023-06-29 RX ORDER — ROSUVASTATIN CALCIUM 20 MG/1
20 TABLET, COATED ORAL NIGHTLY
Qty: 90 TABLET | Refills: 9 | Status: SHIPPED | OUTPATIENT
Start: 2023-06-29

## 2023-06-29 RX ORDER — LISINOPRIL 30 MG/1
30 TABLET ORAL DAILY
Qty: 90 TABLET | Refills: 9 | Status: SHIPPED | OUTPATIENT
Start: 2023-06-29

## 2024-02-26 ENCOUNTER — OFFICE VISIT (OUTPATIENT)
Dept: OBGYN CLINIC | Facility: CLINIC | Age: 59
End: 2024-02-26
Payer: COMMERCIAL

## 2024-02-26 VITALS
DIASTOLIC BLOOD PRESSURE: 82 MMHG | WEIGHT: 171.38 LBS | BODY MASS INDEX: 32.36 KG/M2 | HEART RATE: 92 BPM | SYSTOLIC BLOOD PRESSURE: 120 MMHG | HEIGHT: 61 IN

## 2024-02-26 DIAGNOSIS — Z01.419 WELL WOMAN EXAM WITH ROUTINE GYNECOLOGICAL EXAM: Primary | ICD-10-CM

## 2024-02-26 DIAGNOSIS — Z12.31 ENCOUNTER FOR SCREENING MAMMOGRAM FOR MALIGNANT NEOPLASM OF BREAST: ICD-10-CM

## 2024-02-26 DIAGNOSIS — Z12.4 SCREENING FOR CERVICAL CANCER: ICD-10-CM

## 2024-02-26 PROCEDURE — 99396 PREV VISIT EST AGE 40-64: CPT | Performed by: OBSTETRICS & GYNECOLOGY

## 2024-02-26 NOTE — PROGRESS NOTES
Alda Hartley is a 58 year old female  No LMP recorded. (Menstrual status: Menopause).   Chief Complaint   Patient presents with    Gyn Exam     ANNUAL EXAM, MAMMO ORDER   Presenting for well woman exam. Last pap smear was normal 2023; pt requests yearly pap smears. Last mammogram was normal 3/2023. No concerns today.    OBSTETRICS HISTORY:  OB History    Para Term  AB Living   2 2 2 0 0 2   SAB IAB Ectopic Multiple Live Births   0 0 0 0 2       GYNE HISTORY:  No LMP recorded. (Menstrual status: Menopause).    History   Sexual Activity    Sexual activity: Yes        Pap Date: 23  Pap Result Notes: Neg Pap/HPV // Mammo 3/25/23 Diag C2-Benign  Follow Up Recommendation: Annual 23 JMN      MEDICAL HISTORY:  Past Medical History:   Diagnosis Date    Obesity (BMI 30-39.9)     Snoring          SURGICAL HISTORY:  Past Surgical History:   Procedure Laterality Date    INCISION AND DRAINAGE  2016    JASON BIOPSY STEREO NODULE 2 SITE BILAT (CPT=19081/71306)          NEEDLE BIOPSY LEFT      REDUCTION LEFT      1982    REDUCTION RIGHT      1982       SOCIAL HISTORY:  Social History     Socioeconomic History    Marital status:      Spouse name: Not on file    Number of children: Not on file    Years of education: Not on file    Highest education level: Not on file   Occupational History    Not on file   Tobacco Use    Smoking status: Never     Passive exposure: Never    Smokeless tobacco: Not on file   Vaping Use    Vaping Use: Never used   Substance and Sexual Activity    Alcohol use: Yes     Comment: Socially, couple times a week    Drug use: Never    Sexual activity: Yes   Other Topics Concern     Service Not Asked    Blood Transfusions Not Asked    Caffeine Concern Yes     Comment: Coffee, 2 cups per day     Occupational Exposure Not Asked    Hobby Hazards Not Asked    Sleep Concern Not Asked    Stress Concern Not Asked    Weight Concern Not Asked    Special Diet Not  Asked    Back Care Not Asked    Exercise Not Asked    Bike Helmet Not Asked    Seat Belt Not Asked    Self-Exams Not Asked   Social History Narrative    Not on file     Social Determinants of Health     Financial Resource Strain: Not on file   Food Insecurity: Not on file   Transportation Needs: Not on file   Physical Activity: Not on file   Stress: Not on file   Social Connections: Not on file   Housing Stability: Not on file         Depression Screening (PHQ-2/PHQ-9): Over the LAST 2 WEEKS   Little interest or pleasure in doing things (over the last two weeks)?: Not at all    Feeling down, depressed, or hopeless (over the last two weeks)?: Not at all    PHQ-2 SCORE: 0           MEDICATIONS:    Current Outpatient Medications:     lisinopril 30 MG Oral Tab, Take 1 tablet (30 mg total) by mouth daily. FOR BLOOD PRESSURE., Disp: 90 tablet, Rfl: 9    rosuvastatin 20 MG Oral Tab, Take 1 tablet (20 mg total) by mouth nightly. FOR CHOLESTEROL., Disp: 90 tablet, Rfl: 9    Probiotic Product (HEALTHY COLON OR), Take by mouth., Disp: , Rfl:     ALLERGIES:  No Known Allergies      Review of Systems:  Review of Systems   All other systems reviewed and are negative.       Vitals:    02/26/24 1420   BP: 120/82   Pulse: 92       PHYSICAL EXAM:   Physical Exam  Vitals reviewed.   Constitutional:       Appearance: Normal appearance.   HENT:      Head: Atraumatic.   Eyes:      Pupils: Pupils are equal, round, and reactive to light.   Pulmonary:      Effort: Pulmonary effort is normal.   Chest:   Breasts:     Right: Normal. No bleeding, inverted nipple, mass, nipple discharge, skin change or tenderness.      Left: Normal. No bleeding, inverted nipple, mass, nipple discharge, skin change or tenderness.   Abdominal:      General: Abdomen is flat.      Palpations: Abdomen is soft.      Tenderness: There is no abdominal tenderness.   Genitourinary:     General: Normal vulva.      Exam position: Lithotomy position.      Labia:          Right: No rash, tenderness, lesion or injury.         Left: No rash, tenderness, lesion or injury.       Vagina: Normal.      Cervix: Normal.      Uterus: Normal. Not tender.       Adnexa: Right adnexa normal and left adnexa normal.        Right: No tenderness or fullness.          Left: No tenderness or fullness.     Lymphadenopathy:      Upper Body:      Right upper body: No supraclavicular, axillary or pectoral adenopathy.      Left upper body: No supraclavicular, axillary or pectoral adenopathy.   Skin:     General: Skin is warm and dry.   Neurological:      General: No focal deficit present.      Mental Status: She is alert and oriented to person, place, and time.   Psychiatric:         Mood and Affect: Mood normal.         Behavior: Behavior normal.         Thought Content: Thought content normal.         Judgment: Judgment normal.           Assessment & Plan:  Alda was seen today for gyn exam.    Diagnoses and all orders for this visit:    Well woman exam with routine gynecological exam    Encounter for screening mammogram for malignant neoplasm of breast  -     [facility] SIMEON 2D+3D SCREENING BILAT (CPT=77067/54031); Future    Screening for cervical cancer  -     ThinPrep PAP Smear; Future  -     Hpv Dna  High Risk , Thin Prep Collect; Future  -     Hpv Dna  High Risk , Thin Prep Collect  -     ThinPrep PAP Smear  -     THINPREP PAP SMEAR ONLY        Requested Prescriptions      No prescriptions requested or ordered in this encounter       Pap with HPV done. New ASSCP guidelines reviewed in detail. Annual exams encouraged. Mammogram ordered. Call if any vaginal bleeding.  Encouraged 1500 mg calcium w/ vit D.  Encouraged weight bearing exercise.  Follow-up in one year.

## 2024-02-27 LAB — HPV I/H RISK 1 DNA SPEC QL NAA+PROBE: NEGATIVE

## 2024-03-04 ENCOUNTER — OFFICE VISIT (OUTPATIENT)
Dept: INTERNAL MEDICINE CLINIC | Facility: CLINIC | Age: 59
End: 2024-03-04
Payer: COMMERCIAL

## 2024-03-04 ENCOUNTER — LAB ENCOUNTER (OUTPATIENT)
Dept: LAB | Age: 59
End: 2024-03-04
Attending: INTERNAL MEDICINE
Payer: COMMERCIAL

## 2024-03-04 VITALS
OXYGEN SATURATION: 100 % | SYSTOLIC BLOOD PRESSURE: 124 MMHG | WEIGHT: 169.13 LBS | HEIGHT: 61 IN | TEMPERATURE: 98 F | DIASTOLIC BLOOD PRESSURE: 80 MMHG | BODY MASS INDEX: 31.93 KG/M2 | HEART RATE: 82 BPM

## 2024-03-04 DIAGNOSIS — Z12.31 ENCOUNTER FOR SCREENING MAMMOGRAM FOR MALIGNANT NEOPLASM OF BREAST: ICD-10-CM

## 2024-03-04 DIAGNOSIS — Z00.00 ANNUAL PHYSICAL EXAM: ICD-10-CM

## 2024-03-04 DIAGNOSIS — E78.2 MIXED HYPERLIPIDEMIA: ICD-10-CM

## 2024-03-04 DIAGNOSIS — E55.9 VITAMIN D DEFICIENCY: ICD-10-CM

## 2024-03-04 DIAGNOSIS — I10 PRIMARY HYPERTENSION: ICD-10-CM

## 2024-03-04 DIAGNOSIS — M79.671 RIGHT FOOT PAIN: ICD-10-CM

## 2024-03-04 DIAGNOSIS — Z12.11 COLON CANCER SCREENING: ICD-10-CM

## 2024-03-04 DIAGNOSIS — Z00.00 ANNUAL PHYSICAL EXAM: Primary | ICD-10-CM

## 2024-03-04 DIAGNOSIS — Z12.4 CERVICAL CANCER SCREENING: ICD-10-CM

## 2024-03-04 LAB
ALBUMIN SERPL-MCNC: 4.7 G/DL (ref 3.2–4.8)
ALBUMIN/GLOB SERPL: 1.6 {RATIO} (ref 1–2)
ALP LIVER SERPL-CCNC: 88 U/L
ALT SERPL-CCNC: 26 U/L
ANION GAP SERPL CALC-SCNC: 7 MMOL/L (ref 0–18)
AST SERPL-CCNC: 23 U/L (ref ?–34)
BASOPHILS # BLD AUTO: 0.04 X10(3) UL (ref 0–0.2)
BASOPHILS NFR BLD AUTO: 0.9 %
BILIRUB SERPL-MCNC: 1.1 MG/DL (ref 0.2–1.1)
BUN BLD-MCNC: 20 MG/DL (ref 9–23)
BUN/CREAT SERPL: 21.5 (ref 10–20)
CALCIUM BLD-MCNC: 10 MG/DL (ref 8.7–10.4)
CHLORIDE SERPL-SCNC: 107 MMOL/L (ref 98–112)
CHOLEST SERPL-MCNC: 159 MG/DL (ref ?–200)
CO2 SERPL-SCNC: 26 MMOL/L (ref 21–32)
CREAT BLD-MCNC: 0.93 MG/DL
DEPRECATED RDW RBC AUTO: 40.3 FL (ref 35.1–46.3)
EGFRCR SERPLBLD CKD-EPI 2021: 70 ML/MIN/1.73M2 (ref 60–?)
EOSINOPHIL # BLD AUTO: 0.1 X10(3) UL (ref 0–0.7)
EOSINOPHIL NFR BLD AUTO: 2.3 %
ERYTHROCYTE [DISTWIDTH] IN BLOOD BY AUTOMATED COUNT: 11.9 % (ref 11–15)
EST. AVERAGE GLUCOSE BLD GHB EST-MCNC: 108 MG/DL (ref 68–126)
FASTING PATIENT LIPID ANSWER: YES
FASTING STATUS PATIENT QL REPORTED: YES
GLOBULIN PLAS-MCNC: 2.9 G/DL (ref 2.8–4.4)
GLUCOSE BLD-MCNC: 94 MG/DL (ref 70–99)
HBA1C MFR BLD: 5.4 % (ref ?–5.7)
HCT VFR BLD AUTO: 40.8 %
HDLC SERPL-MCNC: 46 MG/DL (ref 40–59)
HGB BLD-MCNC: 13.7 G/DL
IMM GRANULOCYTES # BLD AUTO: 0.02 X10(3) UL (ref 0–1)
IMM GRANULOCYTES NFR BLD: 0.5 %
LDLC SERPL CALC-MCNC: 81 MG/DL (ref ?–100)
LYMPHOCYTES # BLD AUTO: 1.52 X10(3) UL (ref 1–4)
LYMPHOCYTES NFR BLD AUTO: 34.5 %
MCH RBC QN AUTO: 31.1 PG (ref 26–34)
MCHC RBC AUTO-ENTMCNC: 33.6 G/DL (ref 31–37)
MCV RBC AUTO: 92.5 FL
MONOCYTES # BLD AUTO: 0.43 X10(3) UL (ref 0.1–1)
MONOCYTES NFR BLD AUTO: 9.8 %
NEUTROPHILS # BLD AUTO: 2.3 X10 (3) UL (ref 1.5–7.7)
NEUTROPHILS # BLD AUTO: 2.3 X10(3) UL (ref 1.5–7.7)
NEUTROPHILS NFR BLD AUTO: 52 %
NONHDLC SERPL-MCNC: 113 MG/DL (ref ?–130)
OSMOLALITY SERPL CALC.SUM OF ELEC: 292 MOSM/KG (ref 275–295)
PLATELET # BLD AUTO: 181 10(3)UL (ref 150–450)
POTASSIUM SERPL-SCNC: 4.7 MMOL/L (ref 3.5–5.1)
PROT SERPL-MCNC: 7.6 G/DL (ref 5.7–8.2)
RBC # BLD AUTO: 4.41 X10(6)UL
SODIUM SERPL-SCNC: 140 MMOL/L (ref 136–145)
TRIGL SERPL-MCNC: 191 MG/DL (ref 30–149)
TSI SER-ACNC: 2.54 MIU/ML (ref 0.55–4.78)
VIT D+METAB SERPL-MCNC: 42.4 NG/ML (ref 30–100)
VLDLC SERPL CALC-MCNC: 30 MG/DL (ref 0–30)
WBC # BLD AUTO: 4.4 X10(3) UL (ref 4–11)

## 2024-03-04 PROCEDURE — 99396 PREV VISIT EST AGE 40-64: CPT | Performed by: INTERNAL MEDICINE

## 2024-03-04 PROCEDURE — 84443 ASSAY THYROID STIM HORMONE: CPT

## 2024-03-04 PROCEDURE — 80053 COMPREHEN METABOLIC PANEL: CPT

## 2024-03-04 PROCEDURE — 83036 HEMOGLOBIN GLYCOSYLATED A1C: CPT

## 2024-03-04 PROCEDURE — 80061 LIPID PANEL: CPT

## 2024-03-04 PROCEDURE — 82306 VITAMIN D 25 HYDROXY: CPT

## 2024-03-04 PROCEDURE — 36415 COLL VENOUS BLD VENIPUNCTURE: CPT

## 2024-03-04 PROCEDURE — 85025 COMPLETE CBC W/AUTO DIFF WBC: CPT

## 2024-03-04 NOTE — PROGRESS NOTES
Subjective:     Patient ID: Alda Hartley is a 58 year old female.    Patient present today for her annual physical .        History/Other:   Review of Systems   Constitutional:  Positive for fatigue. Negative for appetite change, fever and unexpected weight change.   HENT: Negative.     Eyes: Negative.    Respiratory: Negative.     Cardiovascular: Negative.  Negative for chest pain, palpitations and leg swelling.        Exercises daily 30 min    No pnd   Gastrointestinal: Negative.          No hematemsis   Genitourinary: Negative.         No kidney stones   Musculoskeletal:         Right shoulder pain    Foot pain right for 3 weeks ; no trauma   Allergic/Immunologic: Negative for environmental allergies, food allergies and immunocompromised state.   Neurological:  Negative for syncope.   Hematological: Negative.      Current Outpatient Medications   Medication Sig Dispense Refill    lisinopril 30 MG Oral Tab Take 1 tablet (30 mg total) by mouth daily. FOR BLOOD PRESSURE. 90 tablet 9    rosuvastatin 20 MG Oral Tab Take 1 tablet (20 mg total) by mouth nightly. FOR CHOLESTEROL. 90 tablet 9    Probiotic Product (HEALTHY COLON OR) Take by mouth.       Allergies:No Known Allergies    Past Medical History:   Diagnosis Date    Obesity (BMI 30-39.9)     Snoring       Past Surgical History:   Procedure Laterality Date    INCISION AND DRAINAGE  7/5/2016    JASON BIOPSY STEREO NODULE 2 SITE BILAT (CPT=19081/38724)      2001    NEEDLE BIOPSY LEFT      REDUCTION LEFT      1982    REDUCTION RIGHT      1982      Family History   Problem Relation Age of Onset    Hypertension Brother     Breast Cancer Neg     Ovarian Cancer Neg       Social History:   Social History     Socioeconomic History    Marital status:    Tobacco Use    Smoking status: Never     Passive exposure: Never   Vaping Use    Vaping Use: Never used   Substance and Sexual Activity    Alcohol use: Yes     Comment: Socially, couple times a week    Drug use:  Never    Sexual activity: Yes   Other Topics Concern    Caffeine Concern Yes     Comment: Coffee, 2 cups per day         Objective:   Physical Exam  Constitutional:       General: She is not in acute distress.     Appearance: She is well-developed. She is not ill-appearing, toxic-appearing or diaphoretic.   HENT:      Head: Normocephalic and atraumatic.      Right Ear: Tympanic membrane, ear canal and external ear normal.      Left Ear: Tympanic membrane, ear canal and external ear normal.      Nose: Nose normal.      Mouth/Throat:      Pharynx: No oropharyngeal exudate.   Eyes:      General:         Right eye: No discharge.         Left eye: No discharge.      Conjunctiva/sclera: Conjunctivae normal.      Pupils: Pupils are equal, round, and reactive to light.   Neck:      Vascular: No carotid bruit or JVD.   Cardiovascular:      Rate and Rhythm: Normal rate and regular rhythm.      Pulses: Normal pulses.      Heart sounds: Normal heart sounds. No murmur heard.  Pulmonary:      Effort: Pulmonary effort is normal. No respiratory distress.      Breath sounds: Normal breath sounds. No wheezing or rales.   Abdominal:      General: Bowel sounds are normal. There is no distension.      Palpations: Abdomen is soft. There is no mass.      Tenderness: There is no abdominal tenderness. There is no guarding or rebound.   Musculoskeletal:         General: No tenderness. Normal range of motion.      Cervical back: Normal range of motion and neck supple. No rigidity or tenderness.      Right lower leg: No edema.      Left lower leg: No edema.      Right foot: Normal range of motion and normal capillary refill. No swelling, deformity, bunion, tenderness, bony tenderness or crepitus.      Left foot: Normal range of motion and normal capillary refill. No swelling, deformity, bunion, tenderness, bony tenderness or crepitus.   Lymphadenopathy:      Cervical: No cervical adenopathy.   Skin:     General: Skin is warm and dry.       Coloration: Skin is not jaundiced or pale.      Findings: No rash.   Neurological:      Mental Status: She is alert and oriented to person, place, and time.         Assessment & Plan:   (Z00.00) Annual physical exam  (primary encounter diagnosis)  Plan: CBC With Differential With Platelet, Comp         Metabolic Panel (14), Hemoglobin A1C, Lipid         Panel, Vitamin D, TSH W Reflex To Free T4        Routine labs ordered; pt advised to get latest covid booser and RSV vaccine.     (I10) Primary hypertension  Plan: bp controlled with bp med. Cpm.     (E78.2) Mixed hyperlipidemia  Plan: check lipid panel; continue with statin and low chol diet.     (E55.9) Vitamin D deficiency  Plan: check vit d.     (Z12.31) Encounter for screening mammogram for malignant neoplasm of breast  Plan: pt eloina scheduled for her mammogram .    (Z12.4) Cervical cancer screening  Plan: pt current with her papsmear with her gyne.     (Z12.11) Colon cancer screening  Plan: pt current with her colonoscopy.     (M79.671) Right foot pain  Plan: Podiatry Referral - [facility] ([place]        Suspect plantar fasciitis, will refer to podiatrist.        No orders of the defined types were placed in this encounter.      Meds This Visit:  Requested Prescriptions      No prescriptions requested or ordered in this encounter       Imaging & Referrals:  None

## 2024-03-08 ENCOUNTER — OFFICE VISIT (OUTPATIENT)
Dept: PODIATRY CLINIC | Facility: CLINIC | Age: 59
End: 2024-03-08
Payer: COMMERCIAL

## 2024-03-08 DIAGNOSIS — M76.821 POSTERIOR TIBIAL TENDINITIS OF BOTH LOWER EXTREMITIES: Primary | ICD-10-CM

## 2024-03-08 DIAGNOSIS — M21.00 ACQUIRED VALGUS DEFORMITY OF BOTH LOWER EXTREMITIES: ICD-10-CM

## 2024-03-08 DIAGNOSIS — M76.822 POSTERIOR TIBIAL TENDINITIS OF BOTH LOWER EXTREMITIES: Primary | ICD-10-CM

## 2024-03-08 PROCEDURE — 99203 OFFICE O/P NEW LOW 30 MIN: CPT | Performed by: STUDENT IN AN ORGANIZED HEALTH CARE EDUCATION/TRAINING PROGRAM

## 2024-03-08 NOTE — PROGRESS NOTES
[facility] Podiatry  Progress Note      Alda Hartley is a 58 year old female.   Chief Complaint   Patient presents with    Foot Pain     Consult- bilateral foot- onset- a month ago- feet feel sore after wearing high heels  for a wk and half to work- rates pain 4/10 on and off             HPI:     Patient is a pleasant 58-year-old female presents to clinic today complaining of bilateral arch pain.  Admits to pain started approximately a month ago.  Denies any pedal injuries or trauma.      Allergies: Patient has no known allergies.    Current Outpatient Medications   Medication Sig Dispense Refill    lisinopril 30 MG Oral Tab Take 1 tablet (30 mg total) by mouth daily. FOR BLOOD PRESSURE. 90 tablet 9    rosuvastatin 20 MG Oral Tab Take 1 tablet (20 mg total) by mouth nightly. FOR CHOLESTEROL. 90 tablet 9    Probiotic Product (HEALTHY COLON OR) Take by mouth.        Past Medical History:   Diagnosis Date    Hyperlipidemia     Hypertension     Obesity (BMI 30-39.9)     Snoring       Past Surgical History:   Procedure Laterality Date    COLONOSCOPY      2022 august repeat 5 yrs benign polyp    INCISION AND DRAINAGE  07/05/2016    LASIK      2000    JASON BIOPSY STEREO NODULE 2 SITE BILAT (CPT=19081/58772)      2001    NEEDLE BIOPSY LEFT      REDUCTION LEFT      1982    REDUCTION RIGHT      1982      Family History   Problem Relation Age of Onset    Other (Other) Mother         copd, GERD    Hypertension Mother     No Known Problems Daughter     Hypertension Brother     Breast Cancer Neg       Social History     Socioeconomic History    Marital status:    Tobacco Use    Smoking status: Never     Passive exposure: Never   Vaping Use    Vaping Use: Never used   Substance and Sexual Activity    Alcohol use: Yes     Comment: Socially, couple times a week    Drug use: Never    Sexual activity: Yes   Other Topics Concern    Caffeine Concern Yes     Comment: Coffee, 2 cups per day            REVIEW OF SYSTEMS:      Denies nause, fever, chills  No calf pain  Denies chest pain or SOB      EXAM:   There were no vitals taken for this visit.  GENERAL: well developed, well nourished, in no apparent distress  EXTREMITIES:   1. Integument: Normal skin temperature and turgor  2. Vascular: Dorsalis pedis two out of four bilateral and posterior tibial pulses two out of   four bilateral, capillary refill normal.   3. Musculoskeletal: All muscle groups are graded 5 out of 5 in the foot and ankle.  Pain with palpation to posterior tibial tendon insertion.  Calcaneal valgus noted to bilateral lower extremities.   4. Neurological: Normal sharp dull sensation; reflexes normal.             ASSESSMENT AND PLAN:   Diagnoses and all orders for this visit:    Posterior tibial tendinitis of both lower extremities    Acquired valgus deformity of both lower extremities        Plan:     Discussed the biomechanical etiology of this condition with the patient as well as the implications this condition has on the feet, ankles, knees, hips and lower back.   Discussed the importance of  stretching and using frozen water bottle while seated with socks on to roll on the bottom of the foot once daily for 20 minutes.   Discussed conservative care options which include the use of supportive shoe gear and functional orthotics. Recommend New Balance shoes and On Clouds.  Discussed functional orthotics - orthotics are medically necessary devices to hold the foot in a corrected and rectus position, prevent abnormal motion and stress and reduce pain.   Use supportive shoes in the house. We discussed using crocs for indoors.   Will place authorization for custom orthotics  RTC in 1 month      The patient indicates understanding of these issues and agrees to the plan.        Nini Moe DPM

## 2024-04-15 ENCOUNTER — HOSPITAL ENCOUNTER (OUTPATIENT)
Dept: MAMMOGRAPHY | Age: 59
Discharge: HOME | End: 2024-04-15
Attending: OBSTETRICS & GYNECOLOGY
Payer: COMMERCIAL

## 2024-04-15 DIAGNOSIS — Z12.31 ENCOUNTER FOR SCREENING MAMMOGRAM FOR MALIGNANT NEOPLASM OF BREAST: ICD-10-CM

## 2024-04-15 PROCEDURE — 77067 SCR MAMMO BI INCL CAD: CPT | Performed by: OBSTETRICS & GYNECOLOGY

## 2024-04-15 PROCEDURE — 77063 BREAST TOMOSYNTHESIS BI: CPT | Performed by: OBSTETRICS & GYNECOLOGY

## 2024-05-15 ENCOUNTER — PATIENT MESSAGE (OUTPATIENT)
Dept: INTERNAL MEDICINE CLINIC | Facility: CLINIC | Age: 59
End: 2024-05-15

## 2024-05-15 ENCOUNTER — NURSE TRIAGE (OUTPATIENT)
Dept: INTERNAL MEDICINE CLINIC | Facility: CLINIC | Age: 59
End: 2024-05-15

## 2024-05-15 NOTE — TELEPHONE ENCOUNTER
Reason for Disposition   Patient wants to be seen    Protocols used: Cough-A-OH  Action Requested: Summary for Provider     []  Critical Lab, Recommendations Needed  [] Need Additional Advice  []   FYI    []   Need Orders  [] Need Medications Sent to Pharmacy  []  Other     SUMMARY: - I have had a head cold for about a week now. It started with a sore throat on 5/7/24 and then congestion, headaches and coughing on 5/10. Today the congestion is much better. Home Covid tests were negative and I am not sure if I have a sinus infection, as I never had one. I can't get into see you until 6/7/24, but I am sure all symptoms will be gone by then. What can I take for the congestion? Thanks     Reason for call: Cough/URI  Advised and scheduled office visit due to length of time with symptoms.

## 2024-05-16 ENCOUNTER — OFFICE VISIT (OUTPATIENT)
Dept: INTERNAL MEDICINE CLINIC | Facility: CLINIC | Age: 59
End: 2024-05-16

## 2024-05-16 ENCOUNTER — TELEPHONE (OUTPATIENT)
Facility: CLINIC | Age: 59
End: 2024-05-16

## 2024-05-16 ENCOUNTER — TELEPHONE (OUTPATIENT)
Dept: INTERNAL MEDICINE CLINIC | Facility: CLINIC | Age: 59
End: 2024-05-16

## 2024-05-16 VITALS
OXYGEN SATURATION: 98 % | HEIGHT: 61 IN | BODY MASS INDEX: 30.96 KG/M2 | DIASTOLIC BLOOD PRESSURE: 88 MMHG | SYSTOLIC BLOOD PRESSURE: 132 MMHG | HEART RATE: 82 BPM | WEIGHT: 164 LBS

## 2024-05-16 DIAGNOSIS — J01.90 ACUTE NON-RECURRENT SINUSITIS, UNSPECIFIED LOCATION: Primary | ICD-10-CM

## 2024-05-16 PROCEDURE — 99213 OFFICE O/P EST LOW 20 MIN: CPT | Performed by: NURSE PRACTITIONER

## 2024-05-16 RX ORDER — FLUTICASONE PROPIONATE 50 MCG
2 SPRAY, SUSPENSION (ML) NASAL DAILY
Qty: 15.8 ML | Refills: 0 | Status: SHIPPED | OUTPATIENT
Start: 2024-05-16 | End: 2024-05-17

## 2024-05-16 RX ORDER — ECHINACEA PURPUREA EXTRACT 125 MG
2 TABLET ORAL AS NEEDED
Qty: 60 ML | Refills: 0 | Status: SHIPPED | OUTPATIENT
Start: 2024-05-16

## 2024-05-16 RX ORDER — BENZONATATE 200 MG/1
200 CAPSULE ORAL 3 TIMES DAILY PRN
Qty: 30 CAPSULE | Refills: 0 | Status: SHIPPED | OUTPATIENT
Start: 2024-05-16

## 2024-05-16 RX ORDER — NITROFURANTOIN 25; 75 MG/1; MG/1
CAPSULE ORAL
COMMUNITY
Start: 2024-05-01

## 2024-05-16 NOTE — TELEPHONE ENCOUNTER
As discussed with patient during visit, sinusitis is typically viral and she should try symptomatic care first. If no improvement in the next 5-7 days, should follow up.

## 2024-05-16 NOTE — PATIENT INSTRUCTIONS
Tylenol 500 mg every 6-8 hours as needed  Nasal saline 2 sprays in each nostril every 3-4 hours as needed.   Flonase 1-2 sprays in each nostril once daily. Use 15 minutes after saline rinse.   Benzonatate capsules for cough as needed.

## 2024-05-16 NOTE — TELEPHONE ENCOUNTER
[employer] message sent. Per chart review, patient has two upcoming appointments.  Future Appointments   Date Time Provider Department Center   5/17/2024 10:20 AM Spenser Hutson MD [facility]   5/23/2024  9:30 AM Andrea Sanchez MD [facility]

## 2024-05-16 NOTE — TELEPHONE ENCOUNTER
Patient scheduled self for appointment reason of possibly acute sinus infection.    Please call patient to discuss and reschedule

## 2024-05-16 NOTE — TELEPHONE ENCOUNTER
Spoke with the patient,verified full name and     Was seen today but did not get a script.    Stated has already been sick with symptoms for 1 week and dose not want to wait any longer.    Stated has a follow up appointment with her Sleep study on Tuesday and would like to be better by than.    Pharmacy verified [facility]    Please advise

## 2024-05-16 NOTE — PROGRESS NOTES
Alda Hartley is a 59 year old female.  HPI:   Pt c/o cold sx that started approx 1 week ago. Reports both dry and productive, mild, intermittent cough. Reports nasal congestion but denies any runny nose. Denies any hx of asthma/COPD, no seasonal allergies reported. She reports trying sinus OTC medication 1-2 days ago but has not had resolution of sx. Denies any new or worsening sx. She denies any CP, SOB, wheezing, runny nose, recent travel, body aches, fever, chills, rash, abdominal pain, NVDC, new or worsening sx. Has not tried any other treatments.   Current Outpatient Medications   Medication Sig Dispense Refill    nitrofurantoin monohydrate macro 100 MG Oral Cap       fluticasone propionate 50 MCG/ACT Nasal Suspension 2 sprays by Each Nare route daily. 15.8 mL 0    sodium chloride 0.65 % Nasal Solution 2 sprays by Nasal route as needed. 60 mL 0    benzonatate 200 MG Oral Cap Take 1 capsule (200 mg total) by mouth 3 (three) times daily as needed for cough. 30 capsule 0    lisinopril 30 MG Oral Tab Take 1 tablet (30 mg total) by mouth daily. FOR BLOOD PRESSURE. 90 tablet 9    rosuvastatin 20 MG Oral Tab Take 1 tablet (20 mg total) by mouth nightly. FOR CHOLESTEROL. 90 tablet 9    Probiotic Product (HEALTHY COLON OR) Take by mouth.        Past Medical History:    Hyperlipidemia    Hypertension    Obesity (BMI 30-39.9)    Snoring      Social History:  Social History     Socioeconomic History    Marital status:    Tobacco Use    Smoking status: Never     Passive exposure: Never   Vaping Use    Vaping status: Never Used   Substance and Sexual Activity    Alcohol use: Yes     Comment: Socially, couple times a week    Drug use: Never    Sexual activity: Yes   Other Topics Concern    Caffeine Concern Yes     Comment: Coffee, 2 cups per day         REVIEW OF SYSTEMS:   Review of Systems   All other systems reviewed and are negative.         EXAM:   /88   Pulse 82   Ht 5' 1\" (1.549 m)   Wt 164 lb  (74.4 kg)   SpO2 98%   BMI 30.99 kg/m²     Physical Exam  Vitals reviewed.   Constitutional:       General: She is not in acute distress.     Appearance: Normal appearance. She is normal weight. She is not ill-appearing, toxic-appearing or diaphoretic.   HENT:      Head: Normocephalic and atraumatic.      Right Ear: Tympanic membrane, ear canal and external ear normal.      Left Ear: Tympanic membrane, ear canal and external ear normal.      Nose: Congestion present. No rhinorrhea.      Mouth/Throat:      Pharynx: Oropharynx is clear. No oropharyngeal exudate or posterior oropharyngeal erythema.   Eyes:      General: No scleral icterus.        Right eye: No discharge.         Left eye: No discharge.      Extraocular Movements: Extraocular movements intact.      Conjunctiva/sclera: Conjunctivae normal.      Pupils: Pupils are equal, round, and reactive to light.   Neck:      Thyroid: No thyroid mass or thyromegaly.   Cardiovascular:      Rate and Rhythm: Normal rate and regular rhythm.      Pulses: Normal pulses.      Heart sounds: Normal heart sounds.   Pulmonary:      Effort: Pulmonary effort is normal. No respiratory distress.      Breath sounds: Normal breath sounds. No stridor. No wheezing, rhonchi or rales.   Chest:      Chest wall: No tenderness.   Musculoskeletal:         General: No swelling.      Cervical back: Normal range of motion and neck supple. No rigidity or tenderness.   Lymphadenopathy:      Cervical: No cervical adenopathy.   Skin:     General: Skin is warm and dry.      Coloration: Skin is not jaundiced.   Neurological:      Mental Status: She is alert and oriented to person, place, and time.      Motor: Motor function is intact.      Gait: Gait normal.   Psychiatric:         Mood and Affect: Mood normal.         Judgment: Judgment normal.            ASSESSMENT AND PLAN:   1. Acute non-recurrent sinusitis, unspecified location  -Discussed with patient likely viral nature, appropriate use of  abx.   -Nasal saline 2 sprays in each nostril every 3-4 hours as needed.   Flonase 1-2 sprays in each nostril once daily.   Tylenol 500 mg every 6-8 hours as needed (max dose 3000 mg/day).   Hydrate, humidifier, rest, honey/elderberry   - fluticasone propionate 50 MCG/ACT Nasal Suspension; 2 sprays by Each Nare route daily.  Dispense: 15.8 mL; Refill: 0  - sodium chloride 0.65 % Nasal Solution; 2 sprays by Nasal route as needed.  Dispense: 60 mL; Refill: 0  - benzonatate 200 MG Oral Cap; Take 1 capsule (200 mg total) by mouth 3 (three) times daily as needed for cough.  Dispense: 30 capsule; Refill: 0       The patient indicates understanding of these issues and agrees to the plan.  The patient is asked to return in PRN/5-7 days.     The above note was creating using Dragon speech recognition technology. Please excuse any typos.

## 2024-05-16 NOTE — TELEPHONE ENCOUNTER
Patient seen today in office for cough/sinusitis.   Patient states she was advised by provider to give it another 5 days or so for symptoms to get better, however patient is requesting an antibiotic.   This RN went over Samantha's NP instruction notes from today's visit and advised patient to try NP's advice first.   Patient states she would like an antibiotic called in.

## 2024-05-16 NOTE — TELEPHONE ENCOUNTER
As I discussed with patient during visit, sx are present approx 1 week and likely viral, abx not appropriate at this time. She has not tried symptomatic care, needs to try that first.

## 2024-05-17 ENCOUNTER — OFFICE VISIT (OUTPATIENT)
Dept: INTERNAL MEDICINE CLINIC | Facility: CLINIC | Age: 59
End: 2024-05-17

## 2024-05-17 VITALS
DIASTOLIC BLOOD PRESSURE: 83 MMHG | OXYGEN SATURATION: 98 % | SYSTOLIC BLOOD PRESSURE: 122 MMHG | BODY MASS INDEX: 31.34 KG/M2 | TEMPERATURE: 98 F | HEART RATE: 92 BPM | WEIGHT: 166 LBS | HEIGHT: 61 IN

## 2024-05-17 DIAGNOSIS — J01.90 ACUTE BACTERIAL SINUSITIS: Primary | ICD-10-CM

## 2024-05-17 DIAGNOSIS — B96.89 ACUTE BACTERIAL SINUSITIS: Primary | ICD-10-CM

## 2024-05-17 PROCEDURE — 99213 OFFICE O/P EST LOW 20 MIN: CPT | Performed by: STUDENT IN AN ORGANIZED HEALTH CARE EDUCATION/TRAINING PROGRAM

## 2024-05-17 RX ORDER — AMOXICILLIN AND CLAVULANATE POTASSIUM 875; 125 MG/1; MG/1
1 TABLET, FILM COATED ORAL 2 TIMES DAILY
Qty: 20 TABLET | Refills: 0 | Status: SHIPPED | OUTPATIENT
Start: 2024-05-17 | End: 2024-05-27

## 2024-05-17 RX ORDER — AZELASTINE HYDROCHLORIDE 137 UG/1
1-2 SPRAY, METERED NASAL 2 TIMES DAILY
Qty: 30 ML | Refills: 11 | Status: SHIPPED | OUTPATIENT
Start: 2024-05-17

## 2024-05-17 RX ORDER — FLUTICASONE PROPIONATE 50 MCG
2 SPRAY, SUSPENSION (ML) NASAL DAILY
Qty: 1 EACH | Refills: 11 | Status: SHIPPED | OUTPATIENT
Start: 2024-05-17

## 2024-05-17 RX ORDER — METHYLPREDNISOLONE 4 MG/1
TABLET ORAL
Qty: 1 EACH | Refills: 0 | Status: SHIPPED | OUTPATIENT
Start: 2024-05-17

## 2024-05-17 NOTE — PROGRESS NOTES
OFFICE NOTE     Patient ID: Alda Hartley is a 59 year old female.  Today's Date: 05/17/24  Chief Complaint: Sinus Problem (10 days Sinus pressure, runny nose, sore throat, cough, loss of taste/smell - 2 neg covid tests)    Pt is a 60y/o female with Pmhx of HLD, HTN, vitmain D def, DIANA on CPAP (started in march), whom presents to clinic with ongoing sinus congestion for +10 days, rhinorrhea, sore thral cough, 2x negative covid test.     Works in [employer]     Sinus Problem  This is a new problem. The current episode started 1 to 4 weeks ago. The problem has been gradually worsening since onset. There has been no fever. Her pain is at a severity of 6/10. The pain is moderate. Associated symptoms include chills, congestion, coughing, headaches, a hoarse voice, sinus pressure, sneezing and swollen glands. Pertinent negatives include no diaphoresis or shortness of breath.         Vitals:    05/17/24 1006   BP: 122/83   Pulse: 92   Temp: 97.8 °F (36.6 °C)   TempSrc: Oral   SpO2: 98%   Weight: 166 lb (75.3 kg)   Height: 5' 1\" (1.549 m)     body mass index is 31.37 kg/m².  BP Readings from Last 3 Encounters:   05/17/24 122/83   05/16/24 132/88   03/04/24 124/80     The 10-year ASCVD risk score (Don ALBRECHT, et al., 2019) is: 4.2%    Values used to calculate the score:      Age: 59 years      Sex: Female      Is Non- : No      Diabetic: No      Tobacco smoker: No      Systolic Blood Pressure: 122 mmHg      Is BP treated: Yes      HDL Cholesterol: 46 mg/dL      Total Cholesterol: 159 mg/dL      Medications reviewed:  Current Outpatient Medications   Medication Sig Dispense Refill    nitrofurantoin monohydrate macro 100 MG Oral Cap       fluticasone propionate 50 MCG/ACT Nasal Suspension 2 sprays by Each Nare route daily. 15.8 mL 0    sodium chloride 0.65 % Nasal Solution 2 sprays by Nasal route as needed. 60 mL 0    lisinopril 30 MG Oral Tab Take 1 tablet (30 mg  total) by mouth daily. FOR BLOOD PRESSURE. 90 tablet 9    rosuvastatin 20 MG Oral Tab Take 1 tablet (20 mg total) by mouth nightly. FOR CHOLESTEROL. 90 tablet 9    Probiotic Product (HEALTHY COLON OR) Take by mouth.      benzonatate 200 MG Oral Cap Take 1 capsule (200 mg total) by mouth 3 (three) times daily as needed for cough. (Patient not taking: Reported on 5/17/2024) 30 capsule 0         Assessment & Plan    There are no diagnoses linked to this encounter.  Patient presenting URI and now sinus congestion tenderness and erythematous nasal turbinates consistent with acute secondary acute bacterial rhinosinusitis.  Plan:  -Use Flonase and Azelastine 1 puff each nostril daily for next month or till symptom resolves   Augmentin 1 tab BID for total 10 day course   -Start medrol dose pack for severe inflammation  -for sore throat, educated to purchase OTC benzocaine lozenges for laryngitis/pharyngitis as well  -take hot showers, drink tea and increase fluid intake   -If no improvement also able to follow up with myself or Dr Sanchez for follow up in 10-14 days     Follow Up: As needed/if symptoms worsen or No follow-ups on file..         Objective/ Results:   Physical Exam  Constitutional:       Appearance: She is well-developed.   HENT:      Nose: Congestion (bilateraly) and rhinorrhea present.      Right Turbinates: Enlarged and swollen.      Left Turbinates: Enlarged and swollen.   Cardiovascular:      Rate and Rhythm: Normal rate and regular rhythm.      Heart sounds: Normal heart sounds.   Pulmonary:      Effort: Pulmonary effort is normal.      Breath sounds: Normal breath sounds.   Abdominal:      General: Bowel sounds are normal.      Palpations: Abdomen is soft.   Skin:     General: Skin is warm and dry.   Neurological:      Mental Status: She is alert and oriented to person, place, and time.      Deep Tendon Reflexes: Reflexes are normal and symmetric.        Reviewed:    Patient Active Problem List     Diagnosis    Primary hypertension    Stress    Other fatigue    Acute non-recurrent sinusitis    Overweight (BMI 25.0-29.9)    Encounter for therapeutic drug monitoring    Vitamin D deficiency    Low vitamin B12 level    Obesity (BMI 30-39.9)    Snoring    Mixed hyperlipidemia    Mass in neck    Paronychia of finger      No Known Allergies     Social History     Socioeconomic History    Marital status:    Tobacco Use    Smoking status: Never     Passive exposure: Never   Vaping Use    Vaping status: Never Used   Substance and Sexual Activity    Alcohol use: Yes     Comment: Socially, couple times a week    Drug use: Never    Sexual activity: Yes   Other Topics Concern    Caffeine Concern Yes     Comment: Coffee, 2 cups per day       Review of Systems   Constitutional:  Positive for chills. Negative for diaphoresis.   HENT:  Positive for congestion, hoarse voice, postnasal drip, sinus pressure and sneezing.    Respiratory:  Positive for cough. Negative for shortness of breath.    Cardiovascular: Negative.    Gastrointestinal: Negative.    Skin: Negative.    Neurological:  Positive for headaches.     All other systems negative unless otherwise stated in ROS or HPI above.       Spenser Hutson MD  Internal Medicine       Call office with any questions or seek emergency care if necessary.   Patient understands and agrees to follow directions and advice.      ----------------------------------------- PATIENT INSTRUCTIONS-----------------------------------------     There are no Patient Instructions on file for this visit.      The 21st Century Cures Act makes medical notes available to patients in the interest of transparency.  However, please be advised that this is a medical document.  It is intended as a peer to peer communication.  It is written in medical language and may contain abbreviations or verbiage that are technical and unfamiliar.  It may appear blunt or direct.  Medical documents are intended to carry  relevant information, facts as evident, and the clinical opinion of the practitioner.

## 2024-05-23 ENCOUNTER — HOSPITAL ENCOUNTER (OUTPATIENT)
Dept: GENERAL RADIOLOGY | Age: 59
Discharge: HOME | End: 2024-05-23
Attending: INTERNAL MEDICINE

## 2024-05-23 ENCOUNTER — OFFICE VISIT (OUTPATIENT)
Dept: INTERNAL MEDICINE CLINIC | Facility: CLINIC | Age: 59
End: 2024-05-23

## 2024-05-23 VITALS
BODY MASS INDEX: 31.55 KG/M2 | OXYGEN SATURATION: 98 % | DIASTOLIC BLOOD PRESSURE: 80 MMHG | HEART RATE: 87 BPM | SYSTOLIC BLOOD PRESSURE: 124 MMHG | WEIGHT: 167.13 LBS | HEIGHT: 61 IN | TEMPERATURE: 98 F

## 2024-05-23 DIAGNOSIS — J01.90 ACUTE BACTERIAL SINUSITIS: Primary | ICD-10-CM

## 2024-05-23 DIAGNOSIS — R05.1 ACUTE COUGH: ICD-10-CM

## 2024-05-23 DIAGNOSIS — B96.89 ACUTE BACTERIAL SINUSITIS: Primary | ICD-10-CM

## 2024-05-23 PROCEDURE — 99213 OFFICE O/P EST LOW 20 MIN: CPT | Performed by: INTERNAL MEDICINE

## 2024-05-23 PROCEDURE — 71046 X-RAY EXAM CHEST 2 VIEWS: CPT | Performed by: INTERNAL MEDICINE

## 2024-05-23 NOTE — PROGRESS NOTES
Subjective:     Patient ID: Alda Hartley is a 59 year old female.    Patient presents today for follow-up.  She was seen last week in the clinic and diagnosed of acute sinusitis.  She was given Augmentin and also Flonase nasal spray as well as azelastine nasal spray.  She has shown some improvement however still states that she has been still coughing.  No shortness of breath no fevers no wheezing noted.  She was concerned since coworker actually have the same symptoms and apparently developed pneumonia.        History/Other:   Review of Systems   Constitutional: Negative.  Negative for fever.   HENT:  Positive for congestion. Negative for ear discharge, ear pain and sore throat.    Eyes: Negative.    Respiratory:  Positive for cough. Negative for shortness of breath and wheezing.    Cardiovascular: Negative.    Gastrointestinal: Negative.      Current Outpatient Medications   Medication Sig Dispense Refill    amoxicillin clavulanate 875-125 MG Oral Tab Take 1 tablet by mouth 2 (two) times daily for 10 days. 20 tablet 0    azelastine 137 MCG/SPRAY Nasal Solution 1-2 sprays by Nasal route in the morning and 1-2 sprays before bedtime. FOR SINUS SYMPTOMS/NASAL CONGESTION.. 30 mL 11    fluticasone propionate 50 MCG/ACT Nasal Suspension 2 sprays by Each Nare route daily. FOR NASAL CONGESTION/SINUS SYMPTOMS. 1 each 11    sodium chloride 0.65 % Nasal Solution 2 sprays by Nasal route as needed. 60 mL 0    benzonatate 200 MG Oral Cap Take 1 capsule (200 mg total) by mouth 3 (three) times daily as needed for cough. 30 capsule 0    lisinopril 30 MG Oral Tab Take 1 tablet (30 mg total) by mouth daily. FOR BLOOD PRESSURE. 90 tablet 9    rosuvastatin 20 MG Oral Tab Take 1 tablet (20 mg total) by mouth nightly. FOR CHOLESTEROL. 90 tablet 9    Probiotic Product (HEALTHY COLON OR) Take by mouth.      methylPREDNISolone 4 MG Oral Tablet Therapy Pack Take as directed with food. (Patient not taking: Reported on 5/23/2024) 1 each  0    nitrofurantoin monohydrate macro 100 MG Oral Cap  (Patient not taking: Reported on 5/23/2024)       Allergies:No Known Allergies    Past Medical History:    Hyperlipidemia    Hypertension    Obesity (BMI 30-39.9)    Snoring      Past Surgical History:   Procedure Laterality Date    Colonoscopy      2022 august repeat 5 yrs benign polyp    Incision and drainage  07/05/2016    Lasik      2000    Victor M biopsy stereo nodule 2 site bilat (cpt=19081/36397)      2001    Needle biopsy left      Reduction left      1982    Reduction right      1982      Family History   Problem Relation Age of Onset    Other (Other) Mother         copd, GERD    Hypertension Mother     No Known Problems Daughter     Hypertension Brother     Breast Cancer Neg       Social History:   Social History     Socioeconomic History    Marital status:    Tobacco Use    Smoking status: Never     Passive exposure: Never   Vaping Use    Vaping status: Never Used   Substance and Sexual Activity    Alcohol use: Yes     Comment: Socially, couple times a week    Drug use: Never    Sexual activity: Yes   Other Topics Concern    Caffeine Concern Yes     Comment: Coffee, 2 cups per day         Objective:   Physical Exam  Constitutional:       General: She is not in acute distress.     Appearance: She is not ill-appearing, toxic-appearing or diaphoretic.   HENT:      Right Ear: Tympanic membrane, ear canal and external ear normal.      Left Ear: Tympanic membrane, ear canal and external ear normal.      Nose: Congestion present.      Mouth/Throat:      Pharynx: No oropharyngeal exudate or posterior oropharyngeal erythema.   Eyes:      General: No scleral icterus.        Right eye: No discharge.         Left eye: No discharge.      Conjunctiva/sclera: Conjunctivae normal.   Cardiovascular:      Rate and Rhythm: Normal rate and regular rhythm.      Heart sounds: Normal heart sounds. No murmur heard.     No gallop.   Pulmonary:      Effort: Pulmonary effort  is normal. No respiratory distress.      Breath sounds: Normal breath sounds. No stridor. No wheezing, rhonchi or rales.   Musculoskeletal:      Cervical back: Normal range of motion and neck supple. No rigidity or tenderness.      Right lower leg: No edema.      Left lower leg: No edema.   Lymphadenopathy:      Cervical: No cervical adenopathy.   Neurological:      Mental Status: She is alert.         Assessment & Plan:   (J01.90,  B96.89) Acute bacterial sinusitis  (primary encounter diagnosis)  Plan: The patient is slowly progressing or improving with current treatment of Augmentin as well as nasal sprays given about a week ago however she was concerned about the lingering cough.  Her lungs are clear on exam.  I told her we will get a chest x-ray just to make sure were not developing a pneumonia.  Otherwise I told her to keep on taking antibiotics as prescribed.  Call us back if there is any worsening of symptoms.    (R05.1) Acute cough  Plan: XR CHEST PA + LAT CHEST (CPT=71046)        See above.       No orders of the defined types were placed in this encounter.      Meds This Visit:  Requested Prescriptions      No prescriptions requested or ordered in this encounter       Imaging & Referrals:  None

## 2024-08-26 ENCOUNTER — TELEPHONE (OUTPATIENT)
Dept: OBGYN CLINIC | Facility: CLINIC | Age: 59
End: 2024-08-26

## 2024-08-26 ENCOUNTER — PATIENT MESSAGE (OUTPATIENT)
Dept: OBGYN CLINIC | Facility: CLINIC | Age: 59
End: 2024-08-26

## 2024-08-26 ENCOUNTER — LAB ENCOUNTER (OUTPATIENT)
Dept: LAB | Facility: HOSPITAL | Age: 59
End: 2024-08-26
Attending: OBSTETRICS & GYNECOLOGY
Payer: COMMERCIAL

## 2024-08-26 DIAGNOSIS — R35.0 URINARY FREQUENCY: Primary | ICD-10-CM

## 2024-08-26 DIAGNOSIS — R35.0 URINARY FREQUENCY: ICD-10-CM

## 2024-08-26 LAB
BILIRUB UR QL: NEGATIVE
CLARITY UR: CLEAR
COLOR UR: COLORLESS
GLUCOSE UR-MCNC: NORMAL MG/DL
HGB UR QL STRIP.AUTO: NEGATIVE
KETONES UR-MCNC: NEGATIVE MG/DL
LEUKOCYTE ESTERASE UR QL STRIP.AUTO: NEGATIVE
NITRITE UR QL STRIP.AUTO: NEGATIVE
PH UR: 5.5 [PH] (ref 5–8)
PROT UR-MCNC: NEGATIVE MG/DL
SP GR UR STRIP: <1.005 (ref 1–1.03)
UROBILINOGEN UR STRIP-ACNC: NORMAL

## 2024-08-26 PROCEDURE — 81003 URINALYSIS AUTO W/O SCOPE: CPT

## 2024-08-26 NOTE — TELEPHONE ENCOUNTER
From: Alda Hartley  To: Shelli Brown  Sent: 8/26/2024 12:06 PM CDT  Subject: UTI    Hi - I think I might have a UTI. I was getting them frequently last year. Can you order a urine test for me? I can come into the lab on [facility] in [place]. Thank you.

## 2024-08-26 NOTE — TELEPHONE ENCOUNTER
Patient has pelvic pain & frequent urination. Patient wants to know if she can get an order for UTI

## 2024-08-26 NOTE — TELEPHONE ENCOUNTER
Pt with c/o urinary frequency, pelvic pressure with urination, and incomplete bladder emptying. Order placed for UA. Pt stated she will go [facility] location and call for results later this afternoon. Pt advised to push fluids.

## 2024-11-22 ENCOUNTER — OFFICE VISIT (OUTPATIENT)
Dept: SURGERY | Facility: CLINIC | Age: 59
End: 2024-11-22
Payer: COMMERCIAL

## 2024-11-22 VITALS
BODY MASS INDEX: 33.61 KG/M2 | HEIGHT: 61.2 IN | HEART RATE: 99 BPM | WEIGHT: 178 LBS | OXYGEN SATURATION: 98 % | DIASTOLIC BLOOD PRESSURE: 80 MMHG | SYSTOLIC BLOOD PRESSURE: 132 MMHG

## 2024-11-22 DIAGNOSIS — E55.9 VITAMIN D DEFICIENCY: ICD-10-CM

## 2024-11-22 DIAGNOSIS — G47.33 OSA ON CPAP: ICD-10-CM

## 2024-11-22 DIAGNOSIS — E66.9 OBESITY (BMI 30-39.9): ICD-10-CM

## 2024-11-22 DIAGNOSIS — I10 HYPERTENSION, UNSPECIFIED TYPE: ICD-10-CM

## 2024-11-22 DIAGNOSIS — E78.5 DYSLIPIDEMIA: Primary | ICD-10-CM

## 2024-11-22 RX ORDER — TIRZEPATIDE 2.5 MG/.5ML
2.5 INJECTION, SOLUTION SUBCUTANEOUS WEEKLY
Qty: 2 ML | Refills: 1 | Status: SHIPPED | OUTPATIENT
Start: 2024-11-22

## 2024-11-22 NOTE — PROGRESS NOTES
[facility]  [address]  [place] [address]  Dept: [phone]  Loc: [phone]       Patient:  Alda Hartley  :      3/16/1965  MRN:      [id number]    Chief Complaint:    Chief Complaint   Patient presents with    Consult    Weight Management    Obesity       SUBJECTIVE     History of Present Illness:  Alda is being seen today for a follow-up for weight management.    Lost OV 2017.    Reports weight gain after having children/pregnancies.   Works for [employer].   Lives with , children have moved out.  Lost 25 lbs this past 2022 with the MG diet.     Past Medical History:   Past Medical History:    Hyperlipidemia    Hypertension    Obesity (BMI 30-39.9)    Snoring        Comorbidities:  Back pain-Improvement?  yes, Joint pain-Improvement?  yes, SHELBI-Improvement?  yes and Snoring-Improvement?  yes    OBJECTIVE     Vitals: /80   Pulse 99   Ht 5' 1.2\" (1.554 m)   Wt 178 lb (80.7 kg)   SpO2 98%   BMI 33.41 kg/m²     Initial weight loss: +12   Total weight loss: +05   Start weight: 173     lbs     Wt Readings from Last 3 Encounters:   24 178 lb (80.7 kg)   24 167 lb 1.6 oz (75.8 kg)   24 166 lb (75.3 kg)       Patient Medications:    Current Outpatient Medications   Medication Sig Dispense Refill    Tirzepatide-Weight Management (ZEPBOUND) 2.5 MG/0.5ML Subcutaneous Solution Auto-injector Inject 2.5 mg into the skin once a week. 2 mL 1    nitrofurantoin monohydrate macro 100 MG Oral Cap  (Patient not taking: Reported on 2024)      lisinopril 30 MG Oral Tab Take 1 tablet (30 mg total) by mouth daily. FOR BLOOD PRESSURE. 90 tablet 9    rosuvastatin 20 MG Oral Tab Take 1 tablet (20 mg total) by mouth nightly. FOR CHOLESTEROL. 90 tablet 9    Probiotic Product (HEALTHY COLON OR) Take by mouth.       Allergies:  Patient has no known allergies.     Social  History:  Reviewed    Surgical History:    Past Surgical History:   Procedure Laterality Date    Colonoscopy      2022 august repeat 5 yrs benign polyp    Incision and drainage  07/05/2016    Lasik      2000    Victor M biopsy stereo nodule 2 site bilat (cpt=19081/20468)      2001    Needle biopsy left      Reduction left      1982    Reduction right      1982     Family History:    Family History   Problem Relation Age of Onset    Other (Other) Mother         copd, GERD    Hypertension Mother     No Known Problems Daughter     Hypertension Brother     Breast Cancer Neg        Food Journal  Reviewed and Discussed:       Patient has a Food Journal?: no   Patient is reading nutrition labels?  yes  Average Caloric Intake:    Average CHO Intake:   Is patient exercising? yes  Type of exercise? BB videos     Eating Habits  Patient states the following:  Eats 3 meal(s) per day  Amount of soda consumption per day:    Amount of water (in ounces) per day:    Toughest challenge: evening snacking   Poor sleep- Now on CPAP    B: [employer] frozen shake includes fruit   L: salad from cafeteria, vinegear and oil, meat, eggs  D: meat, vegetables  S evening: popcorn, candy    Nutritional Goals  Eat 3-4 cups of fresh fruits or vegetables daily    Behavior Modifications Reviewed and Discussed  Eat breakfast, Eat 3 meals per day, Plan meals in advance, Read nutrition labels, Drink 64 oz of water per day, Maintain a daily food journal, Utlize portion control strategies to reduce calorie intake, Identify triggers for eating and manage cues, and Eat slowly and take 20 to 30 minutes to complete each meal    Exercise Goals Reviewed and Discussed    Aim for 150 minutes moderate level exercise weekly with 2-3 days strength training as tolerated    ROS:    Constitutional: negative  Respiratory: negative  Cardiovascular: negative  Gastrointestinal: negative  Musculoskeletal:negative  Neurological: negative  Behavioral/Psych: negative  Endocrine:  negative  All other systems were reviewed and are negative    Physical Exam:   General appearance: alert, appears stated age and cooperative  Head: Normocephalic, without obvious abnormality, atraumatic  Lungs: clear to auscultation bilaterally  Heart: S1, S2 normal, no murmur, click, rub or gallop, regular rate and rhythm  Abdomen: soft, non-tender; bowel sounds normal; no masses,  no organomegaly  Extremities: extremities normal, atraumatic, no cyanosis or edema  Pulses: 2+ and symmetric  Skin: Skin color, texture, turgor normal. No rashes or lesions    ASSESSMENT       Encounter Diagnosis(ses):   Encounter Diagnoses   Name Primary?    Dyslipidemia Yes    Hypertension, unspecified type     Obesity (BMI 30-39.9)     Vitamin D deficiency     DIANA on CPAP        PLAN     DYSLIPIDEMIA: On statin. Recommend dietary changes and lifestyle modifications as discussed below. Monitor.     Lab Results   Component Value Date/Time    CHOLEST 159 03/04/2024 10:09 AM    LDL 81 03/04/2024 10:09 AM    HDL 46 03/04/2024 10:09 AM    TRIG 191 (H) 03/04/2024 10:09 AM    VLDL 30 03/04/2024 10:09 AM    TCHDLRATIO 3.7 03/03/2023 12:52 PM     HYPERTENSION: Blood pressure stable on the above medications. No interval change in antihypertensive medication.     Obesity:  Goals for next month:  1. Keep a food log.  2. Drink 48-64 ounces of non-caloric beverages per day. No fruit juices or regular soda.  3. Increase activity-upper body exercises, walk 10 minutes per day.  4. Increase fruit and vegetable servings to 5-6 per day.      Has taken Vyvanse in the past.  Liked medication, helped control appetite and energy levels.  Lost 8 lbs.     Denies personal or family hx medullary thyroid CA, endocrine neoplasia syndrome, pancreatitis hx, suicidal ideation. No renal impairment, severe GI disease, diabetes, pancreatitis risks noted.    Start Zepbound 2.5 mg weekly.   Increase dose monthly as tolerated.    SQ administration teaching provided to  patient.   Discussed risks, benefits, and side effects of medication.   Contraindications for medication discussed at length. Patient states understanding.     Meet with LILIANA  [employer] [facility].    RTC 6 weeks virtual.    I spent 45 minutes preparing chart, obtaining/reviewing pertinent history, performing medically appropriate examination/evaluations, counseling/educating on assessment and plan, ordering and reviewing tests/medications as needed, referring/communicating with other health care professionals as needed, documenting clinical information, interpreting results, communicating results, and/or coordinating patient care.     MADELEINE Abernathy

## 2024-11-22 NOTE — PROGRESS NOTES
[facility] Consultation Note       Date of Consult:  2024    Patient:  Alda Hartley  :      3/16/1965  MRN:      [id number]    Referring Provider: Dr. Mae ref. provider found    Chief Complaint:    Chief Complaint   Patient presents with    Consult    Weight Management       SUBJECTIVE     History of Present Illness:  Alda Hartley has been referred to me for evaluation and treatment.       60 yo female.  Presents to clinic for assistance with weight loss/maintenance.       Patient {IS / IS NOT:3190} considering medications *** bariatric surgery for weight loss.    Patient {DENIES ANY / HAS:3186} history of eating disorder(s).    Patient is employed at ***.  Patient lives with ***.    Patient's goal weight: ***lb  Biggest weight loss in the past: *** lb  How weight loss was achieved: ***  Heaviest weight ever:  Previous use of medical weight loss medications:    Physical activity:     Sleep: *** hours/night    Sleep screening:       Past Medical History:   Past Medical History:    Hyperlipidemia    Hypertension    Obesity (BMI 30-39.9)    Snoring       OBJECTIVE     Vitals: /80   Pulse 99   Ht 5' 1.2\" (1.554 m)   Wt 178 lb (80.7 kg)   SpO2 98%   BMI 33.41 kg/m²        Wt Readings from Last 6 Encounters:   24 178 lb (80.7 kg)   24 167 lb 1.6 oz (75.8 kg)   24 166 lb (75.3 kg)   24 164 lb (74.4 kg)   24 169 lb 1.6 oz (76.7 kg)   24 171 lb 6.4 oz (77.7 kg)        Patient Medications:    Current Outpatient Medications   Medication Sig Dispense Refill    nitrofurantoin monohydrate macro 100 MG Oral Cap  (Patient not taking: Reported on 2024)      lisinopril 30 MG Oral Tab Take 1 tablet (30 mg total) by mouth daily. FOR BLOOD PRESSURE. 90 tablet 9    rosuvastatin 20 MG Oral Tab Take 1 tablet (20 mg total) by mouth nightly. FOR CHOLESTEROL. 90 tablet 9    Probiotic Product (HEALTHY COLON OR) Take by mouth.         Allergies:  Patient  has no known allergies.     Comorbidities: Dyslipidemia, HTN    Social History: Reviewed     Surgical History:    Past Surgical History:   Procedure Laterality Date    Colonoscopy      2022 august repeat 5 yrs benign polyp    Incision and drainage  07/05/2016    Lasik      2000    Victor M biopsy stereo nodule 2 site bilat (cpt=19081/76597)      2001    Needle biopsy left      Reduction left      1982    Reduction right      1982       Family History:    Family History   Problem Relation Age of Onset    Other (Other) Mother         copd, GERD    Hypertension Mother     No Known Problems Daughter     Hypertension Brother     Breast Cancer Neg        Typical Dietary Intake:  Breakfast AM Snack Lunch PM Snack Dinner   *** *** *** *** ***     After dinner behavior:   Night eating:  Portion sizes:   Binge:   Emotional:   Depression:  Grazing:   Sweet tooth:  Crunchy/salty:  Etoh:    Soda Drinker: {Yes/No:829::\"Yes\"}  If yes, how much?:  ***  Sports Drinks:  {Yes/No:829::\"Yes\"}  If yes, how much?:  ***  Juice:  {Yes/No:829::\"Yes\"}  If yes, how much?:  ***    Number of restaurant or fast food meals/week:  *** meals/week    Nutritional Goals Reviewed and Discussed:     Eat 3-4 cups of fresh fruit or vegetables daily    Behavior Modifications Reviewed and Discussed:    Eat breakfast, Eat 3 meals per day, Plan meals in advance, Read nutrition labels, Drink 64oz of water per day, Maintain a daily food journal, Utilize portion control strategies to reduce calorie intake, Identify triggers for eating and manage cues, and Eat slowly and take 20 to 30 minutes to complete each meal      ROS:  Constitutional: {Findings; ROS constitutional:87173::\"negative\"}  Respiratory: {Findings; ROS respiratory:16233::\"negative\"}  Cardiovascular: {Findings; ROS cardiac:97719::\"negative\"}  Gastrointestinal: {Findings; ROS gastrointestinal:74313::\"negative\"}  Integument/breast: {Findings; ROS skin/breast:19552::\"negative\"}  Hematologic/lymphatic:  {Findings; ROS hematologic/lymphatic:83541::\"negative\"}  Musculoskeletal:{Findings; ROS musculoskeletal:33549::\"negative\"}  Neurological: {Findings; ROS neuro:87357::\"negative\"}  Behavioral/Psych: {Findings; ROS psychiatric:48513::\"negative\"}  Endocrine: {Findings; ROS endocrine:42354::\"negative\"}    Physical Exam:  General appearance: alert, appears stated age, cooperative and obese  Head: Normocephalic, without obvious abnormality, atraumatic  Neck: no adenopathy, no carotid bruit, no JVD, supple, symmetrical, trachea midline and thyroid not enlarged, symmetric, no tenderness/mass/nodules  Lungs: clear to auscultation bilaterally  Heart: S1, S2 normal, no murmur, click, rub or gallop, regular rate and rhythm  Abdomen: soft, non-tender; bowel sounds normal; no masses,  no organomegaly and abdomen obese   Extremities: intact, no edema   Pulses: 2+ and symmetric  Skin: intact   Neurologic: Grossly normal      ASSESSMENT       Encounter Diagnosis(ses):   1. Dyslipidemia    2. Hypertension, unspecified type    3. Obesity (BMI 30-39.9)    4. Vitamin D deficiency        PLAN         Diagnoses and all orders for this visit:    Dyslipidemia    Hypertension, unspecified type    Obesity (BMI 30-39.9)    Vitamin D deficiency      DYSLIPIDEMIA: On statin. Recommend dietary changes and lifestyle modifications as discussed below. Monitor.       Lab Results   Component Value Date/Time    CHOLEST 159 03/04/2024 10:09 AM    LDL 81 03/04/2024 10:09 AM    HDL 46 03/04/2024 10:09 AM    TRIG 191 (H) 03/04/2024 10:09 AM    VLDL 30 03/04/2024 10:09 AM    TCHDLRATIO 3.7 03/03/2023 12:52 PM     HYPERTENSION: Blood pressure stable on the above medications. No interval change in antihypertensive medication.     OBESITY/WEIGHT GAIN:    Recommended intensive lifestyle and behavioral modifications at this time for weight loss.    Educated patient on lifestyle modifications: Mediterranean/Whole Food/Plant Strong/Low Glycemic Index diet, moderate  alcohol consumption, reduced sodium intake to no more than 2,400 mg/day, and at least 150 minutes of moderate physical activity per week.   Avoid processed, poor quality carbohydrates, refined grains, flour, sugar.    Goals for next month:  1. Keep a food log..   2. Drink 64 ounces of non-caloric beverages per day. No fruit juices or regular soda.  3. Aim for 150 minutes moderate exercise per week.    4. Increase fruit and vegetable servings to 5-6 per day.    5. Improve sleep and stress.     Reviewed other labs:  Hx Vitamin D deficiency.   3/4/24- a1c 5.4, CMP, CBC in range.     Discussed medication options for weight loss in detail with patient.     I spent 45 minutes preparing chart, obtaining/reviewing pertinent history, performing medically appropriate examination/evaluations, counseling/educating on assessment and plan, ordering and reviewing tests/medications as needed, referring/communicating with other health care professionals as needed, documenting clinical information, interpreting results, communicating results, and/or coordinating patient care.     RTC 6 weeks virtual.     MADELEINE Abernathy

## 2024-12-14 DIAGNOSIS — E66.9 OBESITY (BMI 30-39.9): ICD-10-CM

## 2024-12-15 DIAGNOSIS — E78.2 MIXED HYPERLIPIDEMIA: ICD-10-CM

## 2024-12-15 DIAGNOSIS — I10 PRIMARY HYPERTENSION: ICD-10-CM

## 2024-12-16 DIAGNOSIS — E78.2 MIXED HYPERLIPIDEMIA: ICD-10-CM

## 2024-12-16 DIAGNOSIS — I10 PRIMARY HYPERTENSION: ICD-10-CM

## 2024-12-17 ENCOUNTER — PATIENT MESSAGE (OUTPATIENT)
Facility: LOCATION | Age: 59
End: 2024-12-17

## 2024-12-17 ENCOUNTER — PATIENT MESSAGE (OUTPATIENT)
Dept: INTERNAL MEDICINE CLINIC | Facility: CLINIC | Age: 59
End: 2024-12-17

## 2024-12-17 DIAGNOSIS — E66.9 OBESITY (BMI 30-39.9): Primary | ICD-10-CM

## 2024-12-17 RX ORDER — LISINOPRIL 30 MG/1
30 TABLET ORAL DAILY
Qty: 90 TABLET | Refills: 1 | Status: SHIPPED | OUTPATIENT
Start: 2024-12-17

## 2024-12-17 RX ORDER — TIRZEPATIDE 5 MG/.5ML
5 INJECTION, SOLUTION SUBCUTANEOUS WEEKLY
Qty: 2 ML | Refills: 0 | Status: SHIPPED | OUTPATIENT
Start: 2024-12-17 | End: 2025-01-08

## 2024-12-17 RX ORDER — LISINOPRIL 30 MG/1
30 TABLET ORAL DAILY
Qty: 90 TABLET | Refills: 9 | OUTPATIENT
Start: 2024-12-17

## 2024-12-17 RX ORDER — ROSUVASTATIN CALCIUM 20 MG/1
20 TABLET, COATED ORAL NIGHTLY
Qty: 90 TABLET | Refills: 1 | Status: SHIPPED | OUTPATIENT
Start: 2024-12-17

## 2024-12-17 RX ORDER — ROSUVASTATIN CALCIUM 20 MG/1
20 TABLET, COATED ORAL NIGHTLY
Qty: 90 TABLET | Refills: 9 | OUTPATIENT
Start: 2024-12-17

## 2024-12-17 NOTE — TELEPHONE ENCOUNTER
Patient called to follow up.  She only has 2 pills left.  Routed high priority to refill team.  Should be under Dr. Sanchez.  Provider changed.  Advised patient duplicates will be declined.

## 2024-12-17 NOTE — TELEPHONE ENCOUNTER
Refill passed per [facility] protocol.    Last signed by Dr. Wyman- patient sees Dr. Sanchez.    Patient only has 2 tablets left.     Requested Prescriptions   Pending Prescriptions Disp Refills    LISINOPRIL 30 MG Oral Tab [Pharmacy Med Name: LISINOPRIL 30 MG TABS 30 Tablet] 90 tablet 9     Sig: TAKE 1 TABLET (30 MG TOTAL) BY MOUTH DAILY. FOR BLOOD PRESSURE.       Hypertension Medications Protocol Passed - 12/17/2024  3:20 PM        Passed - CMP or BMP in past 12 months        Passed - Last BP reading less than 140/90     BP Readings from Last 1 Encounters:   11/22/24 132/80               Passed - In person appointment or virtual visit in the past 12 mos or appointment in next 3 mos     Recent Outpatient Visits              3 weeks ago Dyslipidemia    [facility] Silke Jaimes APRN    Office Visit    6 months ago Acute bacterial sinusitis    [facility] ([place], [place]) Andrea Sanchez MD    Office Visit    7 months ago Acute bacterial sinusitis    [facility] ([place], [place]) Spenser Hutson MD    Office Visit    7 months ago Acute non-recurrent sinusitis, unspecified location    [facility] [place] Sas, Kathryn E., APRN    Office Visit    9 months ago Posterior tibial tendinitis of both lower extremities    [facility] ([place], [place]) Nini Moe DPM    Office Visit          Future Appointments         Provider Department Appt Notes    In 1 month Do Jackson RD [facility]     In 1 month Silke Jaimes APRN [facility]     In 2 months Shelli rBown MD [facility] - OB/GYN Annual exam    In 2 months Andrea Sanchez MD [facility] ([place], [place]) Annual exam - last px 3/4/2024    In 3 months  [facility]                     Passed - EGFRCR or GFRNAA > 50     GFR Evaluation  EGFRCR: 70 , resulted on 3/4/2024            ROSUVASTATIN 20 MG Oral Tab [Pharmacy Med Name: ROSUVASTATIN 20 MG TABLETS 20 Tablet] 90 tablet 9     Sig: TAKE 1 TABLET (20 MG TOTAL) BY MOUTH NIGHTLY. FOR CHOLESTEROL.       Cholesterol Medication Protocol Passed - 12/17/2024  3:20 PM        Passed - ALT < 80     Lab Results   Component Value Date    ALT 26 03/04/2024             Passed - ALT resulted within past year        Passed - Lipid panel within past 12 months     Lab Results   Component Value Date    CHOLEST 159 03/04/2024    TRIG 191 (H) 03/04/2024    HDL 46 03/04/2024    LDL 81 03/04/2024    VLDL 30 03/04/2024    TCHDLRATIO 3.7 03/03/2023    NONHDLC 113 03/04/2024             Passed - In person appointment or virtual visit in the past 12 mos or appointment in next 3 mos     Recent Outpatient Visits              3 weeks ago [facility]    Office Visit    6 months ago Acute bacterial sinusitis    [facility] [place] Andrea Sanchez MD    Office Visit    7 months ago Acute bacterial sinusitis    [facility] [place] Spenser Hutson MD    Office Visit    7 months ago Acute non-recurrent sinusitis, unspecified location    [facility] [address] [place] Samantha Flores APRN    Office Visit    9 months ago Posterior tibial tendinitis of both lower extremities    [facility] [place] Nini Moe DPM    Office Visit          Future Appointments         Provider Department Appt Notes    In 1 month [facility]     In 1 month iSlke Jaimes APRN [facility]     In 2 months Kevin  MD Shelli [facility] [place] - OB/GYN Annual exam    In 2 months Andrea Sanchez MD [facility] Annual exam - last px 3/4/2024    In 3 months Silke Jaimes APRN [facility] [place]                        Future Appointments         Provider Department Appt Notes    In 1 month Do Jackson RD [facility] [place]     In 1 month [facility]     In 2 months Shelli Brown MD [facility] [place] - OB/GYN Annual exam    In 2 months Andrea Sanchez MD [facility] Annual exam - last px 3/4/2024    In 3 months Silke Jaimes APRN [facility] [place]           Recent Outpatient Visits              3 weeks ago Dyslipidemia    [facility] Silke Jaimes APRN    Office Visit    6 months ago Acute bacterial sinusitis    [facility] Andrea Sanchez MD    Office Visit    7 months ago Acute bacterial sinusitis    [facility] Spenser Hutson MD    Office Visit    7 months ago Acute non-recurrent sinusitis, unspecified location    [facility] [place] Samantha Flores APRN    Office Visit    9 months ago Posterior tibial tendinitis of both lower extremities    [facility] Nini Moe DPM    Office Visit

## 2024-12-18 RX ORDER — TIRZEPATIDE 2.5 MG/.5ML
2.5 INJECTION, SOLUTION SUBCUTANEOUS WEEKLY
Qty: 2 ML | Refills: 1 | OUTPATIENT
Start: 2024-12-18

## 2024-12-20 ENCOUNTER — TELEPHONE (OUTPATIENT)
Dept: SURGERY | Facility: CLINIC | Age: 59
End: 2024-12-20

## 2025-01-02 ENCOUNTER — HOSPITAL ENCOUNTER (OUTPATIENT)
Dept: ULTRASOUND IMAGING | Facility: HOSPITAL | Age: 60
Discharge: HOME | End: 2025-01-02
Attending: INTERNAL MEDICINE
Payer: COMMERCIAL

## 2025-01-02 DIAGNOSIS — I65.29 CAROTID ATHEROSCLEROSIS, UNSPECIFIED LATERALITY: ICD-10-CM

## 2025-01-02 PROCEDURE — 93880 EXTRACRANIAL BILAT STUDY: CPT | Performed by: INTERNAL MEDICINE

## 2025-01-13 DIAGNOSIS — E66.9 OBESITY (BMI 30-39.9): ICD-10-CM

## 2025-01-13 RX ORDER — TIRZEPATIDE 5 MG/.5ML
5 INJECTION, SOLUTION SUBCUTANEOUS WEEKLY
Qty: 2 ML | Refills: 0 | Status: SHIPPED | OUTPATIENT
Start: 2025-01-13 | End: 2025-02-04

## 2025-01-21 NOTE — PROGRESS NOTES
INITIAL OUTPATIENT NUTRITION CONSULTATION    Nutrition Assessment    Medical Diagnosis: Obesity  Client Age and Gender: 59 year old female    Labs:   No components found for: \"HGBA1C\"  TRIGLYCERIDES   Date Value Ref Range Status   03/03/2023 276 (H) <150 mg/dL Final     Comment:        If a non-fasting specimen was collected, consider  repeat triglyceride testing on a fasting specimen  if clinically indicated.   Melanie et al. J. of Clin. Lipidol. 2015;9:129-169.          Triglycerides   Date Value Ref Range Status   03/04/2024 191 (H) 30 - 149 mg/dL Final     Comment:     Reference interval for fasting triglycerides  Desirable: <150 mg/dL  Borderline: 150-199 mg/dL  High: 200-499 mg/dL  Very High: >=500 mg/dL           LDL Cholesterol   Date Value Ref Range Status   03/04/2024 81 <100 mg/dL Final     Comment:     Optimal            <100 mg/dL   Near/Above OptimaL 100-129 mg/dL   Borderline High    130-159 mg/dL   High               160-189 mg/dL    Very High          >190 mg/dL          LDL-CHOLESTEROL   Date Value Ref Range Status   03/03/2023 95 mg/dL (calc) Final     Comment:     Reference range: <100     Desirable range <100 mg/dL for primary prevention;    <70 mg/dL for patients with CHD or diabetic patients   with > or = 2 CHD risk factors.     LDL-C is now calculated using the Tee-Ariadne   calculation, which is a validated novel method providing   better accuracy than the Friedewald equation in the   estimation of LDL-C.   Tee ALICEA et al. LUISANA. 2013;310(19): 9190-6564   (http://education.LocalBanya.com/faq/TIM868)       HDL Cholesterol   Date Value Ref Range Status   03/04/2024 46 40 - 59 mg/dL Final     Comment:     Interpretive Information:   An HDL cholesterol <40 mg/dL is low and constitutes a coronary heart disease risk factor. An HDL cholesterol >60 mg/dL is a negative risk factor for coronary heart disease.         HDL CHOLESTEROL   Date Value Ref Range Status   03/03/2023 49 (L) > OR  = 50 mg/dL Final     AST   Date Value Ref Range Status   03/04/2024 23 <=34 U/L Final   03/03/2023 28 10 - 35 U/L Final     ALT   Date Value Ref Range Status   03/04/2024 26 10 - 49 U/L Final   03/03/2023 47 (H) 6 - 29 U/L Final       Social:  Household: , adult children  Occupation: Works for [employer]     Weight Loss Medications: Zepbound 5.0 mg    Food Allergies: NKFA  Food intolerances: mild lactose intolerance  Trigger Foods: no    Patient started weight loss clinic on 11/30/16 with initial weight of 173 lbs 2 oz. Today is her initial visit with the [facility] RD.  Would like to reach a goal weight of 130 lbs. Today's Ht 5' 1.18\" (1.554 m)   Wt 162 lb (73.5 kg)   BMI 30.43 kg/m²  indicating a 11 lbs weight loss since starting the [facility].     Wt Readings from Last 6 Encounters:   11/22/24 178 lb (80.7 kg)   05/23/24 167 lb 1.6 oz (75.8 kg)   05/17/24 166 lb (75.3 kg)   05/16/24 164 lb (74.4 kg)   03/04/24 169 lb 1.6 oz (76.7 kg)   02/26/24 171 lb 6.4 oz (77.7 kg)       Diet History:  Previous Attempts at Weight Loss: MG diet (lost 25 lbs)    Patient seen in clinic today for nutrition counseling to assist with weight loss. Patient is not interested in bariatric surgery. Reports certain challenges/barriers associated with weight loss. Specifically reports portion control and eating out are her concerns. Discussed importance of getting 3 meals/day for adequate nutrition, focusing on at least 3-4 oz protein/meal. Reviewed eating slower (20-30 minutes/meal). She takes a daily probiotic and psyllium husk to support healthy gut la and to help with regularity. Discussed typical diet which consists of 3 meals and 0-1 snacks/day. She is currently tracking her meals MND. Patient was encouraged to start tracking dietary intake using an christopher at initial visit. She is aiming for <100 grams CHO/day. She is currently eating meals at consistent times. She has noticed a decrease in appetite  and cravings since starting Zepbound. No adverse SE reported. She is responsible for grocery shopping and cooking. She is eating out 3 x week. Food log provided; details listed below.      Typical Diet:  Breakfast: (8:00 am) fruit shake OR 2 HB eggs  Snack: SKIPS  Lunch: (12:00 pm) salad with chicken   Snack: SKIPS  Dinner: (6:00 pm) protein (chicken/steak) + vegetables  Snack: orange    Beverages: water, seltzer, Hint water, herbal tea  Alcoholic Beverages: Holidays    Average Calories: 1030/day  Average CHO: 86 grams/day  Average PRO: 68 grams/day  Average H20: 64 + oz/day    Exercise:  Beach body workout 30 minutes/day    Intervention   Education:  Review of Food Intake (pt provided food log/journal - yes)  Importance of keeping a food log  Discussion of food modification to current diet (see GOALS)  Discussed ideas for breakfast, lunch, dinner, snacks  Basic Nutrition Education  Discussion of food groups and what constitutes a serving  Discussion of number of servings in each food group to have per meal or snack  Discussed use of high fiber vs refined carbs; use of lowfat proteins; saturated vs unsaturated fats  Importance of eating consistently and not skipping meals  Importance of protein for satiety  Importance of meal planning  Discussion of need for exercise for weight loss (see GOALS)    Handouts Provided: Healthy Plate, Free Foods List, Protein Supplement Suggestions, Portion Control Tips, List of Carbohydrate, Protein, and Fat Sources.    Goals:   Keep a food record, My Net Diary, select the macros dashboard.  Focus on healthy plate; 1/2 plate vegetables, 1/4 plate protein, 1/4 plate carbohydrates  Start each meal with protein first  Aim for 90 grams protein/day or 20-30 grams protein/meal  Quick convenient protein options: tuna, deli meat (turkey, chicken, roast beef), pre-cooked shrimp or chicken, greek yogurt, cottage cheese, protein shakes  When choosing yogurt aim for option consisting of 10-15  grams protein and  calories/serving  High protein yogurt examples: Siggi;s, Taiwanese, Two Good, Oikos Triple Zero, Dannon light and fit greek, YQ by David Tinsley Chobani Less Sugar  Aim for <100 grams carbohydrates/day   Aim for 64 oz of water/day  Meal plan ahead of time  Use measuring cups and/or food scale for portion control  Start probiotic: VSL #3 ([facility])    Exercise:  Stay active - focus on realistic activities that fit into your schedule  Plan ahead of exercise - treat as an appointment  When getting started with exercise, slowly build up duration and intensity  Aim for 30 minutes or more of moderate to vigorous exercise 5 x week  Incorporate strength training 3 x week for at least 10 minutes    Behavior Modification:  Set small goals for self and focus on realistic, attainable changes to work on long-term.    Monitoring/Evaluation:  Follow up appt scheduled with dietitian      Do Jackson RDN, KATIE  Visit Length: 3:23 pm to 4:46 pm - 82 minutes

## 2025-01-22 ENCOUNTER — OFFICE VISIT (OUTPATIENT)
Dept: SURGERY | Facility: CLINIC | Age: 60
End: 2025-01-22
Payer: COMMERCIAL

## 2025-01-22 VITALS — WEIGHT: 162 LBS | BODY MASS INDEX: 30.58 KG/M2 | HEIGHT: 61.18 IN

## 2025-01-22 DIAGNOSIS — I10 HYPERTENSION, UNSPECIFIED TYPE: ICD-10-CM

## 2025-01-22 DIAGNOSIS — G47.33 OSA ON CPAP: ICD-10-CM

## 2025-01-22 DIAGNOSIS — E55.9 VITAMIN D DEFICIENCY: ICD-10-CM

## 2025-01-22 DIAGNOSIS — E66.9 OBESITY (BMI 30-39.9): Primary | ICD-10-CM

## 2025-01-22 DIAGNOSIS — E78.5 DYSLIPIDEMIA: ICD-10-CM

## 2025-01-22 PROCEDURE — 97802 MEDICAL NUTRITION INDIV IN: CPT | Performed by: DIETITIAN, REGISTERED

## 2025-01-22 NOTE — PATIENT INSTRUCTIONS
It was so nice meeting you today. Please reach out with any questions or concerns @ [email or web address]. Thank you for including me in your weight loss journey. Here's a review of today's visit:    Goals:   Keep a food record, My Net Diary, select the macros dashboard.  Focus on healthy plate; 1/2 plate vegetables, 1/4 plate protein, 1/4 plate carbohydrates  Start each meal with protein first  Aim for 90 grams protein/day or 20-30 grams protein/meal  Quick convenient protein options: tuna, deli meat (turkey, chicken, roast beef), pre-cooked shrimp or chicken, greek yogurt, cottage cheese, protein shakes  When choosing yogurt aim for option consisting of 10-15 grams protein and  calories/serving  High protein yogurt examples: Siggi;s, Tuvaluan, Two Good, Oikos Triple Zero, Dannon light and fit greek, YQ by Yoplait, Fage, Chobani Less Sugar  Aim for <100 grams carbohydrates/day   Aim for 64 oz of water/day  Meal plan ahead of time  Use measuring cups and/or food scale for portion control  Start probiotic: VSL #3 ([facility])    Exercise:  Stay active - focus on realistic activities that fit into your schedule  Plan ahead of exercise - treat as an appointment  When getting started with exercise, slowly build up duration and intensity  Aim for 30 minutes or more of moderate to vigorous exercise 5 x week  Incorporate strength training 3 x week for at least 10 minutes    Behavior Modification:  Set small goals for self and focus on realistic, attainable changes to work on long-term.

## 2025-02-04 ENCOUNTER — HOSPITAL ENCOUNTER (OUTPATIENT)
Age: 60
Discharge: HOME | End: 2025-02-04
Payer: COMMERCIAL

## 2025-02-04 ENCOUNTER — TELEPHONE (OUTPATIENT)
Dept: INTERNAL MEDICINE CLINIC | Facility: CLINIC | Age: 60
End: 2025-02-04

## 2025-02-04 VITALS
DIASTOLIC BLOOD PRESSURE: 85 MMHG | HEART RATE: 88 BPM | SYSTOLIC BLOOD PRESSURE: 138 MMHG | RESPIRATION RATE: 18 BRPM | OXYGEN SATURATION: 100 % | TEMPERATURE: 97 F

## 2025-02-04 DIAGNOSIS — R31.9 URINARY TRACT INFECTION WITH HEMATURIA, SITE UNSPECIFIED: ICD-10-CM

## 2025-02-04 DIAGNOSIS — N39.0 URINARY TRACT INFECTION WITH HEMATURIA, SITE UNSPECIFIED: ICD-10-CM

## 2025-02-04 DIAGNOSIS — R30.0 DYSURIA: Primary | ICD-10-CM

## 2025-02-04 LAB
GLUCOSE UR STRIP-MCNC: NEGATIVE MG/DL
KETONES UR STRIP-MCNC: 15 MG/DL
NITRITE UR QL STRIP: NEGATIVE
PH UR STRIP: 6 [PH]
PROT UR STRIP-MCNC: >=300 MG/DL
SP GR UR STRIP: 1.02
UROBILINOGEN UR STRIP-ACNC: <2 MG/DL

## 2025-02-04 PROCEDURE — 81002 URINALYSIS NONAUTO W/O SCOPE: CPT | Performed by: NURSE PRACTITIONER

## 2025-02-04 PROCEDURE — 99213 OFFICE O/P EST LOW 20 MIN: CPT | Performed by: NURSE PRACTITIONER

## 2025-02-04 RX ORDER — NITROFURANTOIN 25; 75 MG/1; MG/1
100 CAPSULE ORAL 2 TIMES DAILY
Qty: 10 CAPSULE | Refills: 0 | Status: SHIPPED | OUTPATIENT
Start: 2025-02-04 | End: 2025-02-07

## 2025-02-04 RX ORDER — NITROFURANTOIN 25; 75 MG/1; MG/1
100 CAPSULE ORAL 2 TIMES DAILY
Qty: 10 CAPSULE | Refills: 0 | Status: SHIPPED | OUTPATIENT
Start: 2025-02-04 | End: 2025-02-04

## 2025-02-04 RX ORDER — NITROFURANTOIN 25; 75 MG/1; MG/1
CAPSULE ORAL
Refills: 0 | Status: CANCELLED | OUTPATIENT
Start: 2025-02-04

## 2025-02-04 NOTE — DISCHARGE INSTRUCTIONS
Start the antibiotic and finish it completely drink plenty of water urinate whenever you have the urge wipe front to back shower and urinate after having sexual intercourse you will be notified of the urine culture as long as there is no resistance to this antibiotic you will finish it completely.  If you develop severe abdominal pain back pain fever nausea or vomiting or any new or worsening symptoms go to the nearest emergency department

## 2025-02-04 NOTE — ED PROVIDER NOTES
Patient Seen in: [facility]      History     Chief Complaint   Patient presents with    Hematuria    Dysuria     Stated Complaint: Eval-g    Subjective:   HPI      This is a 59-year-old female with history of hyperlipidemia hypertension and UTI typically who takes nitrofurantoin before sexual activity was recently sexually active on Sunday and now having blood in urine and burning with urination that started 3 AM today.  Patient also reports suprapubic pain.  Denies any history of pyelonephritis.  Denies any back pain fever nausea or vomiting.    Objective:     Past Medical History:    Hyperlipidemia    Hypertension    Obesity (BMI 30-39.9)    Snoring              Past Surgical History:   Procedure Laterality Date    Colonoscopy      2022 august repeat 5 yrs benign polyp    Incision and drainage  07/05/2016    Lasik      2000    Victor M biopsy stereo nodule 2 site bilat (cpt=19081/32396)      2001    Needle biopsy left      Reduction left      1982    Reduction right      1982                Social History     Socioeconomic History    Marital status:    Tobacco Use    Smoking status: Never     Passive exposure: Never   Vaping Use    Vaping status: Never Used   Substance and Sexual Activity    Alcohol use: Yes     Comment: Socially, couple times a week    Drug use: Never    Sexual activity: Yes   Other Topics Concern    Caffeine Concern Yes     Comment: Coffee, 2 cups per day               Review of Systems    Positive for stated complaint: Eval-g  Other systems are as noted in HPI.  Constitutional and vital signs reviewed.      All other systems reviewed and negative except as noted above.    Physical Exam     ED Triage Vitals [02/04/25 0815]   /85   Pulse 88   Resp 18   Temp 97.2 °F (36.2 °C)   Temp src Oral   SpO2 100 %   O2 Device None (Room air)       Current Vitals:   Vital Signs  BP: 138/85  Pulse: 88  Resp: 18  Temp: 97.2 °F (36.2 °C)  Temp src: Oral    Oxygen Therapy  SpO2: 100 %  O2  Patient called stating she had an MRI done and states the results were sent to her through her live [facility] yolanda.  Patient states she is wanting to know if she can schedule a virtual visit with  to go over those MRI results with him.    Please call patient at [phone].   Device: None (Room air)        Physical Exam  Vitals and nursing note reviewed.   Constitutional:       Appearance: Normal appearance.   HENT:      Right Ear: External ear normal.      Left Ear: External ear normal.      Nose: Nose normal.      Mouth/Throat:      Mouth: Mucous membranes are moist.      Pharynx: Oropharynx is clear.   Eyes:      Conjunctiva/sclera: Conjunctivae normal.   Abdominal:      Palpations: Abdomen is soft.      Tenderness: There is no abdominal tenderness. There is no right CVA tenderness or left CVA tenderness.   Musculoskeletal:         General: Normal range of motion.      Cervical back: Normal range of motion.   Skin:     General: Skin is warm.      Capillary Refill: Capillary refill takes less than 2 seconds.   Neurological:      General: No focal deficit present.      Mental Status: She is alert and oriented to person, place, and time.             ED Course     Labs Reviewed   [facility] POCT URINALYSIS DIPSTICK - Abnormal; Notable for the following components:       Result Value    Urine Clarity Cloudy (*)     Protein urine >=300 (*)     Ketone, Urine 15 (*)     Bilirubin, Urine Small (*)     Blood, Urine Large (*)     Leukocyte esterase urine Small (*)     All other components within normal limits   URINE CULTURE, ROUTINE            MDM         Medical Decision Making  59-year-old female well-appearing nontoxic with urinary symptoms.  DDx dysuria versus UTI versus interstitial cystitis versus pyelonephritis versus nephrolithiasis.  No clinical indication for labs or imaging as she has no abdominal tenderness or CVA tenderness urine dip will be collected and a urine culture.    Urine dip is resulted above noting leukocytes and blood and ketones concerning for UTI urine culture was sent out and patient will be started on nitrofurantoin after reviewing an old urine culture showing resistance to Bactrim and antibiotics in the family of penicillin we did discuss Cipro or Levaquin but discussed  the possibility of Achilles tendon rupture so she will be placed on nitrofurantoin.  Discussed with the patient that she will be notified of the urine culture push fluids drink cranberry juice urinate or shower after sexual activity and discussed ER precautions.  Patient is agreeable with the plan of care and acknowledges understanding discharge instructions.    Problems Addressed:  Dysuria: acute illness or injury  Urinary tract infection with hematuria, site unspecified: acute illness or injury    Amount and/or Complexity of Data Reviewed  Labs: ordered. Decision-making details documented in ED Course.    Risk  Prescription drug management.        Disposition and Plan     Clinical Impression:  1. Dysuria    2. Urinary tract infection with hematuria, site unspecified         Disposition:  Discharge  2/4/2025  8:34 am    Follow-up:  Andrea Sanchez MD  [address] [place] [address]  [phone]    In 1 week            Medications Prescribed:  Discharge Medication List as of 2/4/2025  8:37 AM              Supplementary Documentation:

## 2025-02-07 ENCOUNTER — PATIENT MESSAGE (OUTPATIENT)
Dept: OTHER | Age: 60
End: 2025-02-07

## 2025-02-07 DIAGNOSIS — E66.9 OBESITY (BMI 30-39.9): ICD-10-CM

## 2025-02-07 RX ORDER — TIRZEPATIDE 5 MG/.5ML
INJECTION, SOLUTION SUBCUTANEOUS
Qty: 2 ML | Refills: 0 | Status: SHIPPED | OUTPATIENT
Start: 2025-02-07

## 2025-02-07 NOTE — TELEPHONE ENCOUNTER
Please call patient, she states in her Fix8 message that she has positive home UTI test, pain with urinating and blood in her urine.

## 2025-02-18 NOTE — PROGRESS NOTES
[facility] [place] BARIATRIC AND WEIGHT LOSS CLINIC  [address]  [facility] [address]  Dept: [phone]  Loc: [phone]    02/18/25    Weight Loss Follow-up Nutrition Session  Alda Hartley is a 59 year old female.     Assessment   Social:  Household: , adult children  Occupation: Works for [employer]     Number of Consults with [facility] RDN: 2    Weight Loss Medications: Zepbound 5.0 mg     Food Allergies: NKFA  Food intolerances: mild lactose intolerance  Trigger Foods: no     Patient started weight loss clinic on 11/30/16 with initial weight of 173 lbs 2 oz. Would like to reach a goal weight of 130 lbs. Today's Ht 5' 1.18\" (1.554 m)   Wt 157 lb (71.2 kg)   BMI 29.49 kg/m²  indicating a 16 lbs weight loss since starting the [facility].     Weight:    Wt Readings from Last 6 Encounters:   01/22/25 162 lb (73.5 kg)   11/22/24 178 lb (80.7 kg)   05/23/24 167 lb 1.6 oz (75.8 kg)   05/17/24 166 lb (75.3 kg)   05/16/24 164 lb (74.4 kg)   03/04/24 169 lb 1.6 oz (76.7 kg)       Diet History:  Patient seen in clinic today for 1 month follow-up nutrition counseling to assist with weight loss. Specifically reports portion control are better controlled and she is making better choices when eating out. She has started adding protein powder to her fruit smoothies. Discussed importance of getting 3 meals/day for adequate nutrition, focusing on at least 3-4 oz protein/meal. Reviewed eating slower (20-30 minutes/meal). She takes a daily probiotic and psyllium husk to support healthy gut la and to help with regularity. Discussed typical diet which consists of 3 meals and 0-1 snacks/day. She is currently tracking her meals MND. She is aiming for <100 grams CHO/day. She is currently eating meals at consistent times. She has noticed a decrease in appetite and cravings since starting Zepbound. No adverse SE reported. She is responsible for grocery shopping and  cooking. She is eating out 3 x week. Food log provided; details listed below.     Typical Diet:  Breakfast: (8:00 am) fruit shake + protein powder OR 2 HB eggs  Snack: SKIPS  Lunch: (12:00 pm) salad with chicken/HB egg and vegetables  Snack: almonds OR SKIPS  Dinner: (6:00 pm) protein (chicken/steak) + vegetables  Snack: orange     Beverages: water, seltzer, Hint water, herbal tea  Alcoholic Beverages: Holidays    Average Calories:  1119 kcal  Average CHO: 102 grams/day  Average PRO: 93 grams/day  Average H20: 64 + oz/day    Averages from 1/22/15 visit:  Average Calories: 1030/day  Average CHO: 86 grams/day  Average PRO: 68 grams/day  Average H20: 64 + oz/day     Exercise:  Beach body workout 30 minutes/day    Body Composition Analysis #1  1. Weight 157.8 lbs  2. BMI 28.9  3. BMR 1262 kcal  4. Percent body fat 42.3% (66.8 lbs)  5. Visceral fat level 15 (goal is <10)  6. ECW/TBW 0.385  7. SMM (skeletal muscle mass) 49.2 lbs               Lean body mass for arms (R & L) 4.54 lbs, 95.4% & 4.54 lbs, 95.7%               Fat body mass for arms (R & L) 5.3 lbs & 5.3 lbs               Lean body mass for trunk 41.0 lbs, 95.7%    Fat body mass for trunk 33.3 lbs               Lean body mass for legs (R & L) 13.18 lbs, 88.0% & 13.21 lbs, 88.2%    Fat body mass for legs (R & L) 9.9 lbs & 9.9 lbs  8. Body fat mass 66.8 lbs   9. Recommended Body fat amount loss 39.5 lb  10. Recommendations/Status add total body resistance/strength training to increase muscle mass and BMR         Intervention   Education:  Review of Food Intake (pt provided food log/journal - yes)  Importance of keeping a food log  Discussion of food modification to current diet (see GOALS)  Discussed ideas for breakfast, lunch, dinner, snacks  Basic Nutrition Education  Discussion of food groups and what constitutes a serving  Discussion of number of servings in each food group to have per meal or snack  Discussed use of high fiber vs refined carbs; use of lowfat  proteins; saturated vs unsaturated fats  Importance of eating consistently and not skipping meals  Importance of protein for satiety  Importance of meal planning  Discussion of need for exercise for weight loss (see GOALS)    Handouts Provided: Healthy Plate, Free Foods List, Protein Supplement Suggestions, Portion Control Tips, List of Carbohydrate, Protein, and Fat Sources.    Goals:   Keep a food record, My Net Diary, select the macros dashboard.  Focus on healthy plate; 1/2 plate vegetables, 1/4 plate protein, 1/4 plate carbohydrates  Start each meal with protein first  Aim for 90 grams protein/day or 20-30 grams protein/meal  Quick convenient protein options: tuna, deli meat (turkey, chicken, roast beef), pre-cooked shrimp or chicken, greek yogurt, cottage cheese, protein shakes  When choosing yogurt aim for option consisting of 10-15 grams protein and  calories/serving  High protein yogurt examples: Siggi;s, Greek, Two Good, Oikos Triple Zero, Dannon light and fit greek, YQ by Yoplait, Fasonia, Chobani Less Sugar  Aim for <100 grams carbohydrates/day   Aim for 64 oz of water/day  Meal plan ahead of time  Use measuring cups and/or food scale for portion control  Start probiotic: VSL #3 ([facility])    Exercise:  Stay active - focus on realistic activities that fit into your schedule  Plan ahead of exercise - treat as an appointment  When getting started with exercise, slowly build up duration and intensity  Aim for 30 minutes or more of moderate to vigorous exercise 5 x week  Incorporate strength training 3 x week for at least 10 minutes    Behavior Modification:  Set small goals for self and focus on realistic, attainable changes to work on long-term.    Monitoring/Evaluation:  Follow up appt scheduled with dietitian      KATIE Olivares RDN  Visit Length: 10:47 am to 11:41 - 53 minutes

## 2025-02-19 ENCOUNTER — TELEMEDICINE (OUTPATIENT)
Dept: SURGERY | Facility: CLINIC | Age: 60
End: 2025-02-19
Payer: COMMERCIAL

## 2025-02-19 VITALS — WEIGHT: 155.38 LBS | BODY MASS INDEX: 29 KG/M2

## 2025-02-19 DIAGNOSIS — G47.33 OSA ON CPAP: ICD-10-CM

## 2025-02-19 DIAGNOSIS — E55.9 VITAMIN D DEFICIENCY: ICD-10-CM

## 2025-02-19 DIAGNOSIS — E66.9 OBESITY (BMI 30-39.9): ICD-10-CM

## 2025-02-19 DIAGNOSIS — I10 HYPERTENSION, UNSPECIFIED TYPE: ICD-10-CM

## 2025-02-19 DIAGNOSIS — Z51.81 ENCOUNTER FOR THERAPEUTIC DRUG MONITORING: ICD-10-CM

## 2025-02-19 DIAGNOSIS — E78.5 DYSLIPIDEMIA: Primary | ICD-10-CM

## 2025-02-19 RX ORDER — TIRZEPATIDE 7.5 MG/.5ML
7.5 INJECTION, SOLUTION SUBCUTANEOUS WEEKLY
Qty: 2 ML | Refills: 1 | Status: SHIPPED | OUTPATIENT
Start: 2025-02-19

## 2025-02-19 NOTE — PROGRESS NOTES
Virtual Video & Audio Check-In    Alda Hartley verbally consents to a Virtual Check-In visit on 25.  Patient has been referred to the [facility] website at [email or web address] to review the yearly Consent to Treat document.    Patient understands and accepts financial responsibility for any deductible, co-insurance and/or co-pays associated with this service.    Summary of topics discussed: Obesity/weight management, Lifestyle and behavior modifications, Medication management.      [facility] BARIATRIC AND WEIGHT LOSS CLINIC  [address]  [facility] [address]  Dept: [phone]  Loc: [phone]       Patient:  Alda Hartley  :      3/16/1965  MRN:      [id number]    Chief Complaint:    Chief Complaint   Patient presents with    Follow - Up    Obesity    Weight Management       SUBJECTIVE     History of Present Illness:  Alda is being seen today for a follow-up for weight management.    Doing well on Zepbound 5 mg weekly.     24 visit:  Reports weight gain after having children/pregnancies.   Works for [employer].   Lives with , children have moved out.  Lost 25 lbs this past 2022 with the MG diet.     Past Medical History:   Past Medical History:    Hyperlipidemia    Hypertension    Obesity (BMI 30-39.9)    Snoring        Comorbidities:  Back pain-Improvement?  yes, Joint pain-Improvement?  yes, SHELBI-Improvement?  yes and Snoring-Improvement?  yes    OBJECTIVE     Vitals: Wt 155 lb 6.4 oz (70.5 kg)   BMI 29.19 kg/m²     Initial weight loss: -23   Total weight loss: -18   Start weight: 173   Zepbound SW: 178     -150 lbs     Wt Readings from Last 3 Encounters:   25 155 lb 6.4 oz (70.5 kg)   25 162 lb (73.5 kg)   24 178 lb (80.7 kg)       Patient Medications:    Current Outpatient Medications   Medication Sig Dispense Refill    Tirzepatide-Weight Management (ZEPBOUND) 7.5 MG/0.5ML  Subcutaneous Solution Auto-injector Inject 7.5 mg into the skin once a week. 2 mL 1    ZEPBOUND 5 MG/0.5ML Subcutaneous Solution Auto-injector INJECT 5 MG INTO THE SKIN ONCE WEEKLY 2 mL 0    lisinopril 30 MG Oral Tab Take 1 tablet (30 mg total) by mouth daily. FOR BLOOD PRESSURE. 90 tablet 1    rosuvastatin 20 MG Oral Tab Take 1 tablet (20 mg total) by mouth nightly. FOR CHOLESTEROL. 90 tablet 1    Probiotic Product (HEALTHY COLON OR) Take by mouth.       Allergies:  Patient has no known allergies.     Social History:  Reviewed    Surgical History:    Past Surgical History:   Procedure Laterality Date    Colonoscopy      2022 august repeat 5 yrs benign polyp    Incision and drainage  07/05/2016    Lasik      2000    Victor M biopsy stereo nodule 2 site bilat (cpt=19081/03309)      2001    Needle biopsy left      Reduction left      1982    Reduction right      1982     Family History:    Family History   Problem Relation Age of Onset    Other (Other) Mother         copd, GERD    Hypertension Mother     No Known Problems Daughter     Hypertension Brother     Breast Cancer Neg        Food Journal  Reviewed and Discussed:       Patient has a Food Journal?: no   Patient is reading nutrition labels?  yes  Average Caloric Intake:    Average CHO Intake:   Is patient exercising? yes  Type of exercise? BB videos     Eating Habits  Patient states the following:  Eats 3 meal(s) per day  Amount of soda consumption per day:    Amount of water (in ounces) per day:    Toughest challenge: evening snacking   Poor sleep- Now on CPAP    Met with Do   Aim for 65 grams protein/day    Nutritional Goals  Eat 3-4 cups of fresh fruits or vegetables daily    Behavior Modifications Reviewed and Discussed  Eat breakfast, Eat 3 meals per day, Plan meals in advance, Read nutrition labels, Drink 64 oz of water per day, Maintain a daily food journal, Utlize portion control strategies to reduce calorie intake, Identify triggers for eating and manage  cues, and Eat slowly and take 20 to 30 minutes to complete each meal    Exercise Goals Reviewed and Discussed    Aim for 150 minutes moderate level exercise weekly with 2-3 days strength training as tolerated    ROS:    Constitutional: negative  Respiratory: negative  Cardiovascular: negative  Gastrointestinal: negative  Musculoskeletal:negative  Neurological: negative  Behavioral/Psych: negative  Endocrine: negative  All other systems were reviewed and are negative    Physical Exam:   General appearance: alert, appears stated age and cooperative  Head: Normocephalic, without obvious abnormality, atraumatic  Lungs: clear to auscultation bilaterally  Heart: S1, S2 normal, no murmur, click, rub or gallop, regular rate and rhythm  Abdomen: soft, non-tender; bowel sounds normal; no masses,  no organomegaly  Extremities: extremities normal, atraumatic, no cyanosis or edema  Pulses: 2+ and symmetric  Skin: Skin color, texture, turgor normal. No rashes or lesions    ASSESSMENT       Encounter Diagnosis(ses):   Encounter Diagnoses   Name Primary?    Dyslipidemia Yes    Hypertension, unspecified type     DIANA on CPAP     Encounter for therapeutic drug monitoring     Obesity (BMI 30-39.9)     Vitamin D deficiency        PLAN     DYSLIPIDEMIA: On statin. Recommend dietary changes and lifestyle modifications as discussed below. Monitor.     Lab Results   Component Value Date/Time    CHOLEST 159 03/04/2024 10:09 AM    LDL 81 03/04/2024 10:09 AM    HDL 46 03/04/2024 10:09 AM    TRIG 191 (H) 03/04/2024 10:09 AM    VLDL 30 03/04/2024 10:09 AM    TCHDLRATIO 3.7 03/03/2023 12:52 PM     HYPERTENSION: Blood pressure stable on the above medications. No interval change in antihypertensive medication.     DIANA: Patient was instructed to continue wearing their CPaP as recommended.     Obesity:  Goals for next month:  1. Keep a food log.  2. Drink 48-64 ounces of non-caloric beverages per day. No fruit juices or regular soda.  3. Aim for 150  minutes moderate level exercise weekly with 2-3 days strength training.  4. Increase fruit and vegetable servings to 5-6 per day.      Has taken Vyvanse in the past.  Liked medication, helped control appetite and energy levels.  Lost 8 lbs.     Denies personal or family hx medullary thyroid CA, endocrine neoplasia syndrome, pancreatitis hx, suicidal ideation. No renal impairment, severe GI disease, diabetes, pancreatitis risks noted.    Continue Zepbound 5 mg weekly.   Increase dose to 7.5 mg weekly at time of refill.   Consider OOP or compound option as needed.  Increase dose monthly as tolerated.    SQ administration teaching provided to patient.   Discussed risks, benefits, and side effects of medication.   Contraindications for medication discussed at length. Patient states understanding.     Met with RD. Follow up as recommended.   Healthy Plate Method.    RTC 2 months.     MADELEINE Abernathy

## 2025-02-21 ENCOUNTER — OFFICE VISIT (OUTPATIENT)
Dept: SURGERY | Facility: CLINIC | Age: 60
End: 2025-02-21
Payer: COMMERCIAL

## 2025-02-21 VITALS — WEIGHT: 157 LBS | BODY MASS INDEX: 29.64 KG/M2 | HEIGHT: 61.18 IN

## 2025-02-21 DIAGNOSIS — G47.33 OSA ON CPAP: ICD-10-CM

## 2025-02-21 DIAGNOSIS — E55.9 VITAMIN D DEFICIENCY: ICD-10-CM

## 2025-02-21 DIAGNOSIS — E78.5 DYSLIPIDEMIA: ICD-10-CM

## 2025-02-21 DIAGNOSIS — E66.9 OBESITY (BMI 30-39.9): Primary | ICD-10-CM

## 2025-02-21 DIAGNOSIS — I10 HYPERTENSION, UNSPECIFIED TYPE: ICD-10-CM

## 2025-02-21 NOTE — PATIENT INSTRUCTIONS
Congratulations on your current weight loss success!   You can reach me at [email or web address] with any questions or concerns. Thank you for including me in your weight loss journey. Here's a review of today's visit:      Body Composition Analysis #1  1. Weight 157.8 lbs  2. BMI 28.9  3. BMR 1262 kcal  4. Percent body fat 42.3% (66.8 lbs)  5. Visceral fat level 15 (goal is <10)  6. ECW/TBW 0.385  7. SMM (skeletal muscle mass) 49.2 lbs               Lean body mass for arms (R & L) 4.54 lbs, 95.4% & 4.54 lbs, 95.7%               Fat body mass for arms (R & L) 5.3 lbs & 5.3 lbs               Lean body mass for trunk 41.0 lbs, 95.7%    Fat body mass for trunk 33.3 lbs               Lean body mass for legs (R & L) 13.18 lbs, 88.0% & 13.21 lbs, 88.2%    Fat body mass for legs (R & L) 9.9 lbs & 9.9 lbs  8. Body fat mass 66.8 lbs   9. Recommended Body fat amount loss 39.5 lb  10. Recommendations/Status add total body resistance/strength training to increase muscle mass and BMR        Goals:   Keep a food record, My Net Diary, select the macros dashboard.  Focus on healthy plate; 1/2 plate vegetables, 1/4 plate protein, 1/4 plate carbohydrates  Start each meal with protein first  Aim for 90 grams protein/day or 20-30 grams protein/meal  Quick convenient protein options: tuna, deli meat (turkey, chicken, roast beef), pre-cooked shrimp or chicken, greek yogurt, cottage cheese, protein shakes  When choosing yogurt aim for option consisting of 10-15 grams protein and  calories/serving  High protein yogurt examples: Siggi;s, Wolof, Two Good, Oikos Triple Zero, Dannon light and fit greek, YQ by Yoplerika, Fasonia, Chobani Less Sugar  Aim for <100 grams carbohydrates/day   Aim for 64 oz of water/day  Meal plan ahead of time  Use measuring cups and/or food scale for portion control  Start probiotic: VSL #3 ([facility])    Exercise:  Stay active - focus on realistic activities that fit into your schedule  Plan ahead of  exercise - treat as an appointment  When getting started with exercise, slowly build up duration and intensity  Aim for 30 minutes or more of moderate to vigorous exercise 5 x week  Incorporate strength training 3 x week for at least 10 minutes    Behavior Modification:  Set small goals for self and focus on realistic, attainable changes to work on long-term.

## 2025-02-27 ENCOUNTER — OFFICE VISIT (OUTPATIENT)
Dept: OBGYN CLINIC | Facility: CLINIC | Age: 60
End: 2025-02-27
Payer: COMMERCIAL

## 2025-02-27 VITALS
SYSTOLIC BLOOD PRESSURE: 108 MMHG | HEART RATE: 89 BPM | DIASTOLIC BLOOD PRESSURE: 76 MMHG | HEIGHT: 61 IN | BODY MASS INDEX: 29.07 KG/M2 | WEIGHT: 154 LBS

## 2025-02-27 DIAGNOSIS — Z01.419 WELL WOMAN EXAM WITH ROUTINE GYNECOLOGICAL EXAM: ICD-10-CM

## 2025-02-27 DIAGNOSIS — Z12.4 SCREENING FOR CERVICAL CANCER: Primary | ICD-10-CM

## 2025-02-27 DIAGNOSIS — Z12.31 ENCOUNTER FOR SCREENING MAMMOGRAM FOR MALIGNANT NEOPLASM OF BREAST: ICD-10-CM

## 2025-02-27 PROCEDURE — 99396 PREV VISIT EST AGE 40-64: CPT | Performed by: OBSTETRICS & GYNECOLOGY

## 2025-02-27 NOTE — PROGRESS NOTES
Alda Hartley is a 59 year old female  No LMP recorded. (Menstrual status: Menopause).   Chief Complaint   Patient presents with    Gyn Exam     ANNUAL EXAM   Presenting for well woman exam. Last pap smear was normal 2024; she requests yearly pap smears. Last mammogram was normal 2024. Has started to have more frequent UTIs; discussed estrace.     OBSTETRICS HISTORY:  OB History    Para Term  AB Living   2 2 2 0 0 2   SAB IAB Ectopic Multiple Live Births   0 0 0 0 2       GYNE HISTORY:  No LMP recorded. (Menstrual status: Menopause).    History   Sexual Activity    Sexual activity: Yes        Pap Date: 24  Pap Result Notes: Neg Pap/HPV // Mammo BILATERAL Benign 4/15/24 BENIGN      MEDICAL HISTORY:  Past Medical History:    Hyperlipidemia    Hypertension    Obesity (BMI 30-39.9)    Snoring         SURGICAL HISTORY:  Past Surgical History:   Procedure Laterality Date    Colonoscopy      2022 repeat 5 yrs benign polyp    Incision and drainage  2016    Lasik          Victor M biopsy stereo nodule 2 site bilat (cpt=19081/58829)          Needle biopsy left      Reduction left      1982    Reduction right      1982       SOCIAL HISTORY:  Social History     Socioeconomic History    Marital status:      Spouse name: Not on file    Number of children: Not on file    Years of education: Not on file    Highest education level: Not on file   Occupational History    Not on file   Tobacco Use    Smoking status: Never     Passive exposure: Never    Smokeless tobacco: Not on file   Vaping Use    Vaping status: Never Used   Substance and Sexual Activity    Alcohol use: Yes     Comment: Socially, couple times a week    Drug use: Never    Sexual activity: Yes   Other Topics Concern     Service Not Asked    Blood Transfusions Not Asked    Caffeine Concern Yes     Comment: Coffee, 2 cups per day     Occupational Exposure Not Asked    Hobby Hazards Not Asked    Sleep  Concern Not Asked    Stress Concern Not Asked    Weight Concern Not Asked    Special Diet Not Asked    Back Care Not Asked    Exercise Not Asked    Bike Helmet Not Asked    Seat Belt Not Asked    Self-Exams Not Asked   Social History Narrative    Not on file     Social Drivers of Health     Food Insecurity: Not on file   Transportation Needs: Not on file   Stress: Not on file   Housing Stability: Not on file         Depression Screening (PHQ-2/PHQ-9): Over the LAST 2 WEEKS   Little interest or pleasure in doing things: Not at all    Feeling down, depressed, or hopeless: Not at all    PHQ-2 SCORE: 0           MEDICATIONS:    Current Outpatient Medications:     Tirzepatide-Weight Management (ZEPBOUND) 7.5 MG/0.5ML Subcutaneous Solution Auto-injector, Inject 7.5 mg into the skin once a week., Disp: 2 mL, Rfl: 1    ZEPBOUND 5 MG/0.5ML Subcutaneous Solution Auto-injector, INJECT 5 MG INTO THE SKIN ONCE WEEKLY, Disp: 2 mL, Rfl: 0    lisinopril 30 MG Oral Tab, Take 1 tablet (30 mg total) by mouth daily. FOR BLOOD PRESSURE., Disp: 90 tablet, Rfl: 1    rosuvastatin 20 MG Oral Tab, Take 1 tablet (20 mg total) by mouth nightly. FOR CHOLESTEROL., Disp: 90 tablet, Rfl: 1    Probiotic Product (HEALTHY COLON OR), Take by mouth., Disp: , Rfl:     ALLERGIES:  Allergies[1]      Review of Systems:  Review of Systems   All other systems reviewed and are negative.       Vitals:    02/27/25 1103   BP: 108/76   Pulse: 89       PHYSICAL EXAM:   Physical Exam  Vitals reviewed.   Constitutional:       Appearance: Normal appearance.   HENT:      Head: Atraumatic.   Eyes:      Pupils: Pupils are equal, round, and reactive to light.   Pulmonary:      Effort: Pulmonary effort is normal.   Chest:   Breasts:     Right: Normal. No bleeding, inverted nipple, mass, nipple discharge, skin change or tenderness.      Left: Normal. No bleeding, inverted nipple, mass, nipple discharge, skin change or tenderness.   Abdominal:      General: Abdomen is flat.       Palpations: Abdomen is soft.      Tenderness: There is no abdominal tenderness.   Genitourinary:     General: Normal vulva.      Exam position: Lithotomy position.      Labia:         Right: No rash, tenderness, lesion or injury.         Left: No rash, tenderness, lesion or injury.       Vagina: Normal.      Cervix: Normal.      Uterus: Normal. Not tender.       Adnexa: Right adnexa normal and left adnexa normal.        Right: No tenderness or fullness.          Left: No tenderness or fullness.     Lymphadenopathy:      Upper Body:      Right upper body: No supraclavicular, axillary or pectoral adenopathy.      Left upper body: No supraclavicular, axillary or pectoral adenopathy.   Skin:     General: Skin is warm and dry.   Neurological:      General: No focal deficit present.      Mental Status: She is alert and oriented to person, place, and time.   Psychiatric:         Mood and Affect: Mood normal.         Behavior: Behavior normal.         Thought Content: Thought content normal.         Judgment: Judgment normal.         Assessment & Plan:  Alda was seen today for gyn exam.    Diagnoses and all orders for this visit:    Screening for cervical cancer  -     ThinPrep PAP Smear; Future  -     Hpv Dna  High Risk , Thin Prep Collect; Future  -     ThinPrep PAP Smear  -     Hpv Dna  High Risk , Thin Prep Collect  -     THINPREP PAP SMEAR ONLY    Well woman exam with routine gynecological exam    Encounter for screening mammogram for malignant neoplasm of breast  -     [facility] 2D+3D SCREENING BILAT (CPT=77067/25596); Future        Requested Prescriptions      No prescriptions requested or ordered in this encounter       Pap with HPV done. New ASSCP guidelines reviewed in detail. Annual exams encouraged. Mammogram ordered. Call if any vaginal bleeding.  Encouraged 1500 mg calcium w/ vit D.  Encouraged weight bearing exercise.  Follow-up in one year.           [1] No Known Allergies

## 2025-02-28 ENCOUNTER — TELEPHONE (OUTPATIENT)
Dept: OBGYN CLINIC | Facility: CLINIC | Age: 60
End: 2025-02-28

## 2025-02-28 LAB
HPV E6+E7 MRNA CVX QL NAA+PROBE: NEGATIVE
LAST PAP RESULT: NORMAL

## 2025-02-28 NOTE — TELEPHONE ENCOUNTER
Last Mammogram: 4/15/2024-Cat 2 negative.   Last annual: Yesterday with Dr. Borwn  PAP: collected yesterday    Spoke with Dr. Brown, she will place order now for routine screening. Alda called and notified.

## 2025-03-07 ENCOUNTER — OFFICE VISIT (OUTPATIENT)
Dept: INTERNAL MEDICINE CLINIC | Facility: CLINIC | Age: 60
End: 2025-03-07
Payer: COMMERCIAL

## 2025-03-07 VITALS
WEIGHT: 155.25 LBS | HEIGHT: 61 IN | SYSTOLIC BLOOD PRESSURE: 120 MMHG | BODY MASS INDEX: 29.31 KG/M2 | HEART RATE: 84 BPM | DIASTOLIC BLOOD PRESSURE: 78 MMHG

## 2025-03-07 DIAGNOSIS — Z12.31 ENCOUNTER FOR SCREENING MAMMOGRAM FOR MALIGNANT NEOPLASM OF BREAST: ICD-10-CM

## 2025-03-07 DIAGNOSIS — Z00.00 ANNUAL PHYSICAL EXAM: Primary | ICD-10-CM

## 2025-03-07 DIAGNOSIS — G47.33 OSA (OBSTRUCTIVE SLEEP APNEA): ICD-10-CM

## 2025-03-07 DIAGNOSIS — I65.29 CAROTID ATHEROSCLEROSIS, UNSPECIFIED LATERALITY: ICD-10-CM

## 2025-03-07 DIAGNOSIS — E55.9 VITAMIN D DEFICIENCY: ICD-10-CM

## 2025-03-07 DIAGNOSIS — E78.2 MIXED HYPERLIPIDEMIA: ICD-10-CM

## 2025-03-07 DIAGNOSIS — Z12.4 CERVICAL CANCER SCREENING: ICD-10-CM

## 2025-03-07 DIAGNOSIS — Z12.11 COLON CANCER SCREENING: ICD-10-CM

## 2025-03-07 DIAGNOSIS — I10 PRIMARY HYPERTENSION: ICD-10-CM

## 2025-03-07 DIAGNOSIS — R93.1 AGATSTON CAC SCORE, <100: ICD-10-CM

## 2025-03-07 PROCEDURE — 99396 PREV VISIT EST AGE 40-64: CPT | Performed by: INTERNAL MEDICINE

## 2025-03-07 RX ORDER — CHOLECALCIFEROL (VITAMIN D3) 50 MCG
1 TABLET ORAL DAILY
COMMUNITY

## 2025-03-07 RX ORDER — MAGNESIUM 200 MG
2 TABLET ORAL NIGHTLY
COMMUNITY

## 2025-03-07 NOTE — PROGRESS NOTES
Subjective:     Patient ID: Alda Hartley is a 59 year old female.    Pt presents today for her annual physical.         History/Other:   Review of Systems   Constitutional: Negative.    Respiratory: Negative.     Cardiovascular: Negative.  Negative for chest pain, palpitations and leg swelling.        5 tmes a week; 30 60min daily  No pnd/orthopnea   Gastrointestinal: Negative.         No hematemeiss   Genitourinary: Negative.    Allergic/Immunologic: Negative for food allergies and immunocompromised state.   Neurological:  Negative for syncope.   Hematological: Negative.      Current Outpatient Medications   Medication Sig Dispense Refill    Cholecalciferol (VITAMIN D) 50 MCG (2000 UT) Oral Tab Take 1 tablet by mouth daily.      Magnesium 200 MG Oral Tab Take 2 tablets (400 mg total) by mouth at bedtime.      psyllium Oral Powd Pack Take by mouth daily. 2 table spoons in the morning      ZEPBOUND 5 MG/0.5ML Subcutaneous Solution Auto-injector INJECT 5 MG INTO THE SKIN ONCE WEEKLY 2 mL 0    lisinopril 30 MG Oral Tab Take 1 tablet (30 mg total) by mouth daily. FOR BLOOD PRESSURE. 90 tablet 1    rosuvastatin 20 MG Oral Tab Take 1 tablet (20 mg total) by mouth nightly. FOR CHOLESTEROL. 90 tablet 1    Probiotic Product (HEALTHY COLON OR) Take by mouth.      Tirzepatide-Weight Management (ZEPBOUND) 7.5 MG/0.5ML Subcutaneous Solution Auto-injector Inject 7.5 mg into the skin once a week. (Patient not taking: Reported on 3/7/2025) 2 mL 1     Allergies:Allergies[1]    Past Medical History:    Hyperlipidemia    Hypertension    Obesity (BMI 30-39.9)    Snoring      Past Surgical History:   Procedure Laterality Date    Colonoscopy      2022 august repeat 5 yrs benign polyp    Incision and drainage  07/05/2016    Lasik      2000    Victor M biopsy stereo nodule 2 site bilat (cpt=19081/39588)      2001    Needle biopsy left      Reduction left      1982    Reduction right      1982      Family History   Problem Relation Age of  Onset    Other (Other) Mother         copd, GERD    Hypertension Mother     No Known Problems Daughter     Hypertension Brother     Breast Cancer Neg       Social History:   Social History     Socioeconomic History    Marital status:    Tobacco Use    Smoking status: Never     Passive exposure: Never    Smokeless tobacco: Never   Vaping Use    Vaping status: Never Used   Substance and Sexual Activity    Alcohol use: Yes     Comment: Socially, couple times a week    Drug use: Never    Sexual activity: Yes   Other Topics Concern    Caffeine Concern Yes     Comment: Coffee, 2 cups per day         Objective:   Physical Exam  Constitutional:       General: She is not in acute distress.     Appearance: She is well-developed. She is not ill-appearing, toxic-appearing or diaphoretic.   HENT:      Head: Normocephalic and atraumatic.      Right Ear: External ear normal.      Left Ear: External ear normal.      Nose: Nose normal.      Mouth/Throat:      Pharynx: No oropharyngeal exudate.   Eyes:      General:         Right eye: No discharge.         Left eye: No discharge.      Conjunctiva/sclera: Conjunctivae normal.      Pupils: Pupils are equal, round, and reactive to light.   Neck:      Vascular: No carotid bruit or JVD.   Cardiovascular:      Rate and Rhythm: Normal rate and regular rhythm.      Heart sounds: Normal heart sounds. No murmur heard.     No friction rub. No gallop.   Pulmonary:      Effort: Pulmonary effort is normal. No respiratory distress.      Breath sounds: Normal breath sounds. No wheezing or rales.   Abdominal:      General: Bowel sounds are normal. There is no distension.      Palpations: Abdomen is soft. There is no mass.      Tenderness: There is no abdominal tenderness. There is no guarding or rebound.   Musculoskeletal:         General: No tenderness. Normal range of motion.      Cervical back: Normal range of motion and neck supple. No rigidity or tenderness.      Right lower leg: No  edema.      Left lower leg: No edema.   Lymphadenopathy:      Cervical: No cervical adenopathy.   Skin:     General: Skin is warm and dry.      Coloration: Skin is not jaundiced or pale.      Findings: No rash.   Neurological:      Mental Status: She is alert and oriented to person, place, and time.         Assessment & Plan:   (Z00.00) Annual physical exam  (primary encounter diagnosis)  Plan: CBC With Differential With Platelet, Comp         Metabolic Panel (14), Hemoglobin A1C, Lipid         Panel, Vitamin D, TSH W Reflex To Free T4        Routine labs been ordered.  She already had her flu vaccine.  Advised to get her pneumococcal vaccine.    (I10) Primary hypertension  Plan: Blood pressure has been controlled with current blood pressure meds.  She had been losing weight being managed by the weight clinic with Zepbound.  We talked about blood pressure might go down as she continues to lose weight.  She will monitor her blood pressure at home.    (E78.2) Mixed hyperlipidemia  Plan: Check lipid panel.  Follow low-fat low-cholesterol diet and current cholesterol medication.    (E55.9) Vitamin D deficiency  Plan: Vitamin D        Continue vitamin D supplement.  Check vitamin D level.    (Z12.31) Encounter for screening mammogram for malignant neoplasm of breast  Plan: She is scheduled for her mammogram ready.    (Z12.4) Cervical cancer screening  Plan: She is current with her Pap smear through her gynecologist.    (Z12.11) Colon cancer screening  Plan: She is current with her colonoscopy.    (R93.1) Agatston CAC score, <100  Plan: Last CAC test done in 2023 scored 50.  She is asymptomatic and continues to be physically active.  She is on statin therapy.    (G47.33) DIANA (obstructive sleep apnea)  Plan: She has not been able to tolerate CPAP machine and currently using mouthguard.  Still having some fatigue however she is being followed by sleep specialist and is going to have another sleep test done soon.    (I65.29)  Carotid atherosclerosis, unspecified laterality  Plan: Had carotid ultrasound showed no stenosis.  She is already on statin therapy.       No orders of the defined types were placed in this encounter.      Meds This Visit:  Requested Prescriptions      No prescriptions requested or ordered in this encounter       Imaging & Referrals:  None            [1] No Known Allergies

## 2025-03-08 ENCOUNTER — LAB ENCOUNTER (OUTPATIENT)
Dept: LAB | Age: 60
End: 2025-03-08
Attending: INTERNAL MEDICINE
Payer: COMMERCIAL

## 2025-03-08 DIAGNOSIS — Z00.00 ANNUAL PHYSICAL EXAM: ICD-10-CM

## 2025-03-08 DIAGNOSIS — E55.9 VITAMIN D DEFICIENCY: ICD-10-CM

## 2025-03-08 DIAGNOSIS — R79.89 ELEVATED LFTS: ICD-10-CM

## 2025-03-08 LAB
ALBUMIN SERPL-MCNC: 4.5 G/DL (ref 3.2–4.8)
ALBUMIN/GLOB SERPL: 1.7 {RATIO} (ref 1–2)
ALP LIVER SERPL-CCNC: 90 U/L
ALT SERPL-CCNC: 41 U/L
ANION GAP SERPL CALC-SCNC: 9 MMOL/L (ref 0–18)
AST SERPL-CCNC: 34 U/L (ref ?–34)
BASOPHILS # BLD AUTO: 0.04 X10(3) UL (ref 0–0.2)
BASOPHILS NFR BLD AUTO: 0.9 %
BILIRUB SERPL-MCNC: 0.7 MG/DL (ref 0.2–1.1)
BUN BLD-MCNC: 15 MG/DL (ref 9–23)
BUN/CREAT SERPL: 18.1 (ref 10–20)
CALCIUM BLD-MCNC: 9.3 MG/DL (ref 8.7–10.4)
CHLORIDE SERPL-SCNC: 106 MMOL/L (ref 98–112)
CHOLEST SERPL-MCNC: 144 MG/DL (ref ?–200)
CO2 SERPL-SCNC: 25 MMOL/L (ref 21–32)
CREAT BLD-MCNC: 0.83 MG/DL
DEPRECATED RDW RBC AUTO: 44 FL (ref 35.1–46.3)
EGFRCR SERPLBLD CKD-EPI 2021: 80 ML/MIN/1.73M2 (ref 60–?)
EOSINOPHIL # BLD AUTO: 0.14 X10(3) UL (ref 0–0.7)
EOSINOPHIL NFR BLD AUTO: 3.1 %
ERYTHROCYTE [DISTWIDTH] IN BLOOD BY AUTOMATED COUNT: 12.7 % (ref 11–15)
EST. AVERAGE GLUCOSE BLD GHB EST-MCNC: 111 MG/DL (ref 68–126)
FASTING PATIENT LIPID ANSWER: YES
FASTING STATUS PATIENT QL REPORTED: YES
GLOBULIN PLAS-MCNC: 2.6 G/DL (ref 2–3.5)
GLUCOSE BLD-MCNC: 82 MG/DL (ref 70–99)
HBA1C MFR BLD: 5.5 % (ref ?–5.7)
HCT VFR BLD AUTO: 41.2 %
HDLC SERPL-MCNC: 42 MG/DL (ref 40–59)
HGB BLD-MCNC: 13.7 G/DL
IMM GRANULOCYTES # BLD AUTO: 0.01 X10(3) UL (ref 0–1)
IMM GRANULOCYTES NFR BLD: 0.2 %
LDLC SERPL CALC-MCNC: 77 MG/DL (ref ?–100)
LYMPHOCYTES # BLD AUTO: 1.69 X10(3) UL (ref 1–4)
LYMPHOCYTES NFR BLD AUTO: 36.9 %
MCH RBC QN AUTO: 31.7 PG (ref 26–34)
MCHC RBC AUTO-ENTMCNC: 33.3 G/DL (ref 31–37)
MCV RBC AUTO: 95.4 FL
MONOCYTES # BLD AUTO: 0.4 X10(3) UL (ref 0.1–1)
MONOCYTES NFR BLD AUTO: 8.7 %
NEUTROPHILS # BLD AUTO: 2.3 X10 (3) UL (ref 1.5–7.7)
NEUTROPHILS # BLD AUTO: 2.3 X10(3) UL (ref 1.5–7.7)
NEUTROPHILS NFR BLD AUTO: 50.2 %
NONHDLC SERPL-MCNC: 102 MG/DL (ref ?–130)
OSMOLALITY SERPL CALC.SUM OF ELEC: 290 MOSM/KG (ref 275–295)
PLATELET # BLD AUTO: 193 10(3)UL (ref 150–450)
POTASSIUM SERPL-SCNC: 4.6 MMOL/L (ref 3.5–5.1)
PROT SERPL-MCNC: 7.1 G/DL (ref 5.7–8.2)
RBC # BLD AUTO: 4.32 X10(6)UL
SODIUM SERPL-SCNC: 140 MMOL/L (ref 136–145)
TRIGL SERPL-MCNC: 142 MG/DL (ref 30–149)
TSI SER-ACNC: 2.71 UIU/ML (ref 0.55–4.78)
VIT D+METAB SERPL-MCNC: 50.6 NG/ML (ref 30–100)
VLDLC SERPL CALC-MCNC: 22 MG/DL (ref 0–30)
WBC # BLD AUTO: 4.6 X10(3) UL (ref 4–11)

## 2025-03-08 PROCEDURE — 86704 HEP B CORE ANTIBODY TOTAL: CPT

## 2025-03-08 PROCEDURE — 84443 ASSAY THYROID STIM HORMONE: CPT

## 2025-03-08 PROCEDURE — 86706 HEP B SURFACE ANTIBODY: CPT

## 2025-03-08 PROCEDURE — 80053 COMPREHEN METABOLIC PANEL: CPT

## 2025-03-08 PROCEDURE — 82306 VITAMIN D 25 HYDROXY: CPT

## 2025-03-08 PROCEDURE — 83036 HEMOGLOBIN GLYCOSYLATED A1C: CPT

## 2025-03-08 PROCEDURE — 86803 HEPATITIS C AB TEST: CPT

## 2025-03-08 PROCEDURE — 86708 HEPATITIS A ANTIBODY: CPT

## 2025-03-08 PROCEDURE — 36415 COLL VENOUS BLD VENIPUNCTURE: CPT

## 2025-03-08 PROCEDURE — 85025 COMPLETE CBC W/AUTO DIFF WBC: CPT

## 2025-03-08 PROCEDURE — 80061 LIPID PANEL: CPT

## 2025-03-08 PROCEDURE — 87340 HEPATITIS B SURFACE AG IA: CPT

## 2025-03-09 ENCOUNTER — TELEPHONE (OUTPATIENT)
Dept: INTERNAL MEDICINE CLINIC | Facility: CLINIC | Age: 60
End: 2025-03-09

## 2025-03-09 DIAGNOSIS — R79.89 ELEVATED LFTS: Primary | ICD-10-CM

## 2025-03-09 LAB
HAV AB SER QL IA: NONREACTIVE
HBV CORE AB SERPL QL IA: NONREACTIVE
HBV SURFACE AB SER QL: NONREACTIVE
HBV SURFACE AB SERPL IA-ACNC: <3.1 MIU/ML
HBV SURFACE AG SER-ACNC: <0.1 [IU]/L
HBV SURFACE AG SERPL QL IA: NONREACTIVE
HCV AB SERPL QL IA: NONREACTIVE

## 2025-03-15 ENCOUNTER — LAB ENCOUNTER (OUTPATIENT)
Dept: LAB | Age: 60
End: 2025-03-15
Attending: INTERNAL MEDICINE
Payer: COMMERCIAL

## 2025-03-15 DIAGNOSIS — R79.89 ELEVATED LFTS: ICD-10-CM

## 2025-03-15 LAB
ALBUMIN SERPL-MCNC: 4.5 G/DL (ref 3.2–4.8)
ALP LIVER SERPL-CCNC: 93 U/L
ALT SERPL-CCNC: 31 U/L
AST SERPL-CCNC: 26 U/L (ref ?–34)
BILIRUB DIRECT SERPL-MCNC: 0.2 MG/DL (ref ?–0.3)
BILIRUB SERPL-MCNC: 0.7 MG/DL (ref 0.2–1.1)
PROT SERPL-MCNC: 7 G/DL (ref 5.7–8.2)

## 2025-03-15 PROCEDURE — 36415 COLL VENOUS BLD VENIPUNCTURE: CPT

## 2025-03-15 PROCEDURE — 80076 HEPATIC FUNCTION PANEL: CPT

## 2025-04-02 ENCOUNTER — PATIENT MESSAGE (OUTPATIENT)
Dept: OBGYN CLINIC | Facility: CLINIC | Age: 60
End: 2025-04-02

## 2025-04-02 RX ORDER — NITROFURANTOIN 25; 75 MG/1; MG/1
100 CAPSULE ORAL ONCE AS NEEDED
Qty: 30 CAPSULE | Refills: 2 | OUTPATIENT
Start: 2025-04-02

## 2025-04-02 NOTE — TELEPHONE ENCOUNTER
Requested Prescriptions     Pending Prescriptions Disp Refills    nitrofurantoin monohydrate macro (MACROBID) 100 MG Oral Cap 30 capsule 2     Sig: Take 1 capsule (100 mg total) by mouth once as needed (Take one pill after intercourse).     Last annual 2/27/25  Last filled May 2023  Taking preventatively for UTIs.     Alda had macrobid prescription on file from Urgent Care when she was previously suspected to have a UTI back in Feb, so she did pick that up now, but only 10 tabs.     To Dr. Brown for refills.

## 2025-04-03 ENCOUNTER — OFFICE VISIT (OUTPATIENT)
Dept: SURGERY | Facility: CLINIC | Age: 60
End: 2025-04-03
Payer: COMMERCIAL

## 2025-04-03 VITALS
DIASTOLIC BLOOD PRESSURE: 80 MMHG | BODY MASS INDEX: 28.44 KG/M2 | HEIGHT: 61.2 IN | HEART RATE: 90 BPM | SYSTOLIC BLOOD PRESSURE: 112 MMHG | WEIGHT: 150.63 LBS | OXYGEN SATURATION: 99 %

## 2025-04-03 DIAGNOSIS — I10 HYPERTENSION, UNSPECIFIED TYPE: ICD-10-CM

## 2025-04-03 DIAGNOSIS — E78.5 DYSLIPIDEMIA: Primary | ICD-10-CM

## 2025-04-03 DIAGNOSIS — Z51.81 ENCOUNTER FOR THERAPEUTIC DRUG MONITORING: ICD-10-CM

## 2025-04-03 DIAGNOSIS — G47.33 OSA ON CPAP: ICD-10-CM

## 2025-04-03 DIAGNOSIS — E66.9 OBESITY (BMI 30-39.9): ICD-10-CM

## 2025-04-03 DIAGNOSIS — E55.9 VITAMIN D DEFICIENCY: ICD-10-CM

## 2025-04-03 PROCEDURE — 99214 OFFICE O/P EST MOD 30 MIN: CPT | Performed by: NURSE PRACTITIONER

## 2025-04-03 RX ORDER — NITROFURANTOIN 25; 75 MG/1; MG/1
100 CAPSULE ORAL ONCE AS NEEDED
Qty: 30 CAPSULE | Refills: 2
Start: 2025-04-03 | End: 2025-04-03

## 2025-04-03 NOTE — PROGRESS NOTES
[facility] [place] [facility]  [address]  [place] [facility] [address]  Dept: [phone]  Loc: [phone]       Patient:  Alda Hartley  :      3/16/1965  MRN:      [id number]    Chief Complaint:    Chief Complaint   Patient presents with    Follow - Up    Weight Problem       SUBJECTIVE     History of Present Illness:  Alda is being seen today for a follow-up for weight management.    Doing well on Zepbound 7.5 mg weekly.   More fatigue.    24 visit:  Reports weight gain after having children/pregnancies.   Works for [employer].   Lives with , children have moved out.  Lost 25 lbs this past 2022 with the MG diet.     Past Medical History:   Past Medical History:    Agatston CAC score, <100        Essential hypertension    Hyperlipidemia    Hypertension    Obesity (BMI 30-39.9)    Sleep apnea    Snoring        Comorbidities:  Back pain-Improvement?  yes, Joint pain-Improvement?  yes, SHELBI-Improvement?  yes and Snoring-Improvement?  yes    OBJECTIVE     Vitals: /80 (BP Location: Right arm, Patient Position: Sitting, Cuff Size: adult)   Pulse 90   Ht 5' 1.2\" (1.554 m)   Wt 150 lb 9.6 oz (68.3 kg)   SpO2 99%   BMI 28.27 kg/m²     Initial weight loss: -05   Total weight loss: -22   Start weight: 173   Zepbound SW: 178     -150 lbs     Wt Readings from Last 3 Encounters:   25 150 lb 9.6 oz (68.3 kg)   25 155 lb 4 oz (70.4 kg)   25 154 lb (69.9 kg)       Patient Medications:    Current Outpatient Medications   Medication Sig Dispense Refill    Cholecalciferol (VITAMIN D) 50 MCG ( UT) Oral Tab Take 1 tablet by mouth daily.      Magnesium 200 MG Oral Tab Take 2 tablets (400 mg total) by mouth at bedtime.      psyllium Oral Powd Pack Take by mouth daily. 2 table spoons in the morning      Tirzepatide-Weight Management (ZEPBOUND) 7.5 MG/0.5ML Subcutaneous Solution  Auto-injector Inject 7.5 mg into the skin once a week. (Patient not taking: Reported on 3/7/2025) 2 mL 1    ZEPBOUND 5 MG/0.5ML Subcutaneous Solution Auto-injector INJECT 5 MG INTO THE SKIN ONCE WEEKLY 2 mL 0    lisinopril 30 MG Oral Tab Take 1 tablet (30 mg total) by mouth daily. FOR BLOOD PRESSURE. 90 tablet 1    rosuvastatin 20 MG Oral Tab Take 1 tablet (20 mg total) by mouth nightly. FOR CHOLESTEROL. 90 tablet 1    Probiotic Product (HEALTHY COLON OR) Take by mouth.       Allergies:  Patient has no known allergies.     Social History:  Reviewed    Surgical History:    Past Surgical History:   Procedure Laterality Date    Colonoscopy      2022 august repeat 5 yrs benign polyp    Incision and drainage  07/05/2016    Lasik      2000    Victor M biopsy stereo nodule 2 site bilat (cpt=19081/31430)      2001    Needle biopsy left      Reduction left      1982    Reduction right      1982     Family History:    Family History   Problem Relation Age of Onset    Other (Other) Father     Other (Other) Mother         copd, GERD    Hypertension Mother     No Known Problems Daughter     Hypertension Brother     Breast Cancer Neg        Food Journal  Reviewed and Discussed:       Patient has a Food Journal?: no   Patient is reading nutrition labels?  yes  Average Caloric Intake:    Average CHO Intake:   Is patient exercising? yes  Type of exercise? BB videos- includes strength     Eating Habits  Patient states the following:  Eats 3 meal(s) per day  Amount of soda consumption per day:    Amount of water (in ounces) per day:    Toughest challenge: evening snacking   Poor sleep- Now on CPAP    Met with Do   Aim for 65 grams protein/day    Nutritional Goals  Eat 3-4 cups of fresh fruits or vegetables daily    Behavior Modifications Reviewed and Discussed  Eat breakfast, Eat 3 meals per day, Plan meals in advance, Read nutrition labels, Drink 64 oz of water per day, Maintain a daily food journal, Utlize portion control strategies to  reduce calorie intake, Identify triggers for eating and manage cues, and Eat slowly and take 20 to 30 minutes to complete each meal    Exercise Goals Reviewed and Discussed    Aim for 150 minutes moderate level exercise weekly with 2-3 days strength training as tolerated    ROS:    Constitutional: negative  Respiratory: negative  Cardiovascular: negative  Gastrointestinal: negative  Musculoskeletal:negative  Neurological: negative  Behavioral/Psych: negative  Endocrine: negative  All other systems were reviewed and are negative    Physical Exam:   General appearance: alert, appears stated age and cooperative  Head: Normocephalic, without obvious abnormality, atraumatic  Lungs: clear to auscultation bilaterally  Heart: S1, S2 normal, no murmur, click, rub or gallop, regular rate and rhythm  Abdomen: soft, non-tender; bowel sounds normal; no masses,  no organomegaly  Extremities: extremities normal, atraumatic, no cyanosis or edema  Pulses: 2+ and symmetric  Skin: Skin color, texture, turgor normal. No rashes or lesions    ASSESSMENT       Encounter Diagnosis(ses):   Encounter Diagnoses   Name Primary?    Dyslipidemia Yes    Hypertension, unspecified type     DIANA on CPAP     Encounter for therapeutic drug monitoring     Obesity (BMI 30-39.9)     Vitamin D deficiency        PLAN     DYSLIPIDEMIA: On statin. Recommend dietary changes and lifestyle modifications as discussed below. Monitor.     Lab Results   Component Value Date/Time    CHOLEST 144 03/08/2025 06:59 AM    LDL 77 03/08/2025 06:59 AM    HDL 42 03/08/2025 06:59 AM    TRIG 142 03/08/2025 06:59 AM    VLDL 22 03/08/2025 06:59 AM    TCHDLRATIO 3.7 03/03/2023 12:52 PM     HYPERTENSION: Blood pressure stable on the above medications. No interval change in antihypertensive medication.     DIANA: Patient was instructed to continue wearing their CPaP as recommended.     Obesity:  Goals for next month:  1. Keep a food log.  2. Drink 48-64 ounces of non-caloric beverages  per day. No fruit juices or regular soda.  3. Aim for 150 minutes moderate level exercise weekly with 2-3 days strength training.  4. Increase fruit and vegetable servings to 5-6 per day.      Has taken Vyvanse in the past.  Liked medication, helped control appetite and energy levels.  Lost 8 lbs.     Denies personal or family hx medullary thyroid CA, endocrine neoplasia syndrome, pancreatitis hx, suicidal ideation. No renal impairment, severe GI disease, diabetes, pancreatitis risks noted.    Continue Zepbound 7.5 mg weekly.   Increase dose monthly as tolerated.    SQ administration teaching provided to patient.   Discussed risks, benefits, and side effects of medication.   Contraindications for medication discussed at length. Patient states understanding.     Met with RD. Follow up as recommended.   Healthy Plate Method.    RTC 4 months.     MADELEINE Abernathy

## 2025-04-18 ENCOUNTER — OFFICE VISIT (OUTPATIENT)
Dept: INTEGRATIVE MEDICINE | Facility: CLINIC | Age: 60
End: 2025-04-18
Payer: COMMERCIAL

## 2025-04-18 DIAGNOSIS — G47.00 INSOMNIA, UNSPECIFIED TYPE: ICD-10-CM

## 2025-04-18 DIAGNOSIS — G89.29 OTHER CHRONIC PAIN: Primary | ICD-10-CM

## 2025-04-18 PROCEDURE — 97811 ACUP 1/> W/O ESTIM EA ADD 15: CPT | Performed by: ACUPUNCTURIST

## 2025-04-18 PROCEDURE — 97810 ACUP 1/> WO ESTIM 1ST 15 MIN: CPT | Performed by: ACUPUNCTURIST

## 2025-04-18 NOTE — PATIENT INSTRUCTIONS
Sleep: Work on sleep hygiene. Specifically, try not to fall asleep watching TV.    Diet:  Do your best to follow an ant-inflammatory diet.  Anti-Inflammatory Diet:  Reduce/Eliminate processed grains such as breads, and pasta  Reduce/Eliminate Dairy  Reduce/eliminate Sugar and sweets  Increase Leafy greens, berries and fish     Avoid processed foods and greasy foods.  Try to drink between 70-90 oz of water per day.

## 2025-04-18 NOTE — PROGRESS NOTES
Alda Hartley is a 60 year old female Acupuncture Therapy.   Chief Complaint: Insomnia  Secondary Complaint: Chronic body pain    Self reported health status:     Patient reported severity of symptom(s) over the last 24 hours  With zero reporting no symptoms and 10 reporting the worst severity    Symptom 1: 4  Symptom 2: 4    Response to last TX: NA    HPI: Alda  Suffers from chronic body pain of unknown origin which mainly affects her joints.  She thinks it may correspond to beginning the weight loss drug Zepbound. Has lost 20lbs.    Additionally she suffers from insomnia and poor sleep.  She has been diagnosed with sleep apnea and uses a CPAP machine. She tends to fall asleep watching TV as we discussed the importance of good sleep hygiene.    She has been diagnosed with hypertension and takes medication.  She would like to get off of that medication.  Cholesterol is high and she takes statins.    We discussed the importance of an ant-inflammatory diet and she will try to incorporate.    Works at [employer] as .    Objective (Pulse, Tongue, Vitals): Today BP was 100/70. Trapezius is hypertonic.  Pulse is overall wiry. Tongue is red at tip.    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

## 2025-04-19 ENCOUNTER — HOSPITAL ENCOUNTER (OUTPATIENT)
Dept: MAMMOGRAPHY | Age: 60
Discharge: HOME | End: 2025-04-19
Attending: OBSTETRICS & GYNECOLOGY
Payer: COMMERCIAL

## 2025-04-19 DIAGNOSIS — Z12.31 ENCOUNTER FOR SCREENING MAMMOGRAM FOR MALIGNANT NEOPLASM OF BREAST: ICD-10-CM

## 2025-04-19 PROCEDURE — 77063 BREAST TOMOSYNTHESIS BI: CPT | Performed by: OBSTETRICS & GYNECOLOGY

## 2025-04-19 PROCEDURE — 77067 SCR MAMMO BI INCL CAD: CPT | Performed by: OBSTETRICS & GYNECOLOGY

## 2025-04-21 ENCOUNTER — OFFICE VISIT (OUTPATIENT)
Dept: INTEGRATIVE MEDICINE | Facility: CLINIC | Age: 60
End: 2025-04-21
Payer: COMMERCIAL

## 2025-04-21 DIAGNOSIS — G89.29 OTHER CHRONIC PAIN: Primary | ICD-10-CM

## 2025-04-21 PROCEDURE — 97811 ACUP 1/> W/O ESTIM EA ADD 15: CPT | Performed by: ACUPUNCTURIST

## 2025-04-21 PROCEDURE — 97810 ACUP 1/> WO ESTIM 1ST 15 MIN: CPT | Performed by: ACUPUNCTURIST

## 2025-04-21 NOTE — PROGRESS NOTES
Alda Hartley is a 60 year old female Acupuncture Therapy.   Chief Complaint: Insomnia  Secondary Complaint: Chronic body pain    Self reported health status:     Patient reported severity of symptom(s) over the last 24 hours  With zero reporting no symptoms and 10 reporting the worst severity    Symptom 1: 4  Symptom 2: 3    Response to last TX: Alda has not noticed a significant change in her insomnia.    The chronic pain improved for several days and she was feeling overall better.  The pain has returned at a lower level but now it is only one-sided.    HPI 4/18/25: Alda suffers from chronic body pain of unknown origin which mainly affects her joints.  She thinks it may correspond to beginning the weight loss drug Zepbound. Has lost 20lbs.    Additionally she suffers from insomnia and poor sleep.  She has been diagnosed with sleep apnea and uses a CPAP machine. She tends to fall asleep watching TV as we discussed the importance of good sleep hygiene.    She has been diagnosed with hypertension and takes medication.  She would like to get off of that medication.  Cholesterol is high and she takes statins.    We discussed the importance of an ant-inflammatory diet and she will try to incorporate.    Works at [employer] as .    Objective (Pulse, Tongue, Vitals):Trapezius is hypertonic.  Pulse is overall wiry. Tongue is red at tip.    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

## 2025-04-23 ENCOUNTER — OFFICE VISIT (OUTPATIENT)
Dept: INTEGRATIVE MEDICINE | Facility: CLINIC | Age: 60
End: 2025-04-23
Payer: COMMERCIAL

## 2025-04-23 DIAGNOSIS — G89.29 OTHER CHRONIC PAIN: ICD-10-CM

## 2025-04-23 DIAGNOSIS — G47.00 INSOMNIA, UNSPECIFIED TYPE: Primary | ICD-10-CM

## 2025-04-23 NOTE — PROGRESS NOTES
Alda Hartley is a 60 year old female No chief complaint on file..     Chief Complaint:     Insomnia  Chronic body pain    Self reported health status:     Patient reported severity of symptom(s) over the last 24 hours  With zero reporting no symptoms and 10 reporting the worst severity    Symptom 1: 4; no change  Symptom 2: 2    Response to last TX: Alda has not noticed a significant change in her insomnia. She states it difficult to fall asleep, but  was able to sleep last night. How ever her chronic body pain improved  and its rate decreased  2 out 10.    Over all she feels the acupuncture TX have been helping.     HPI 4/18/25: Alda suffers from chronic body pain of unknown origin which mainly affects her joints.  She thinks it may correspond to beginning the weight loss drug Zepbound. Has lost 20lbs.    Additionally she suffers from insomnia and poor sleep.  She has been diagnosed with sleep apnea and uses a CPAP machine. She tends to fall asleep watching TV as we discussed the importance of good sleep hygiene.    She has been diagnosed with hypertension and takes medication.  She would like to get off of that medication.  Cholesterol is high and she takes statins.    We discussed the importance of an ant-inflammatory diet and she will try to incorporate.    Works at [employer] as .    Objective (Pulse, Tongue, Vitals):    Pulse: is overall wiry.     Tongue: is red at tip.    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 at [facility].     No  follow-ups on file.     Lisset Johnson L.AC  4/23/2025  4:27 PM

## 2025-04-28 ENCOUNTER — OFFICE VISIT (OUTPATIENT)
Dept: INTEGRATIVE MEDICINE | Facility: CLINIC | Age: 60
End: 2025-04-28
Payer: COMMERCIAL

## 2025-04-28 DIAGNOSIS — G47.00 INSOMNIA, UNSPECIFIED TYPE: ICD-10-CM

## 2025-04-28 DIAGNOSIS — G89.4 CHRONIC PAIN SYNDROME: Primary | ICD-10-CM

## 2025-04-28 PROCEDURE — 97811 ACUP 1/> W/O ESTIM EA ADD 15: CPT | Performed by: ACUPUNCTURIST

## 2025-04-28 PROCEDURE — 97810 ACUP 1/> WO ESTIM 1ST 15 MIN: CPT | Performed by: ACUPUNCTURIST

## 2025-04-28 NOTE — PROGRESS NOTES
Alda Hartley is a 60 year old female Acupuncture Therapy.   Chief Complaint: Chronic body pain  Secondary Complaint: Insomnia  Tertiary Complaint: RT shoulder pain    Self reported health status:     Patient reported severity of symptom(s) over the last 24 hours  With zero reporting no symptoms and 10 reporting the worst severity    Symptom 1: 2  Symptom 2: 3  Symptom 3: 3-4    Response to last TX: Alda has not noticed a significant change in her insomnia and has not had a chance to change her sleep routine.    The chronic pain has overall improved; she is feeling better and able to move w/o much pain.    She does have RT shoulder pain today which she noticed upon waking.    HPI 4/18/25: Alda suffers from chronic body pain of unknown origin which mainly affects her joints.  She thinks it may correspond to beginning the weight loss drug Zepbound. Has lost 20lbs.    Additionally she suffers from insomnia and poor sleep.  She has been diagnosed with sleep apnea and uses a CPAP machine. She tends to fall asleep watching TV as we discussed the importance of good sleep hygiene.    She has been diagnosed with hypertension and takes medication.  She would like to get off of that medication.  Cholesterol is high and she takes statins.    We discussed the importance of an ant-inflammatory diet and she will try to incorporate.    Works at [employer] as .    Objective (Pulse, Tongue, Vitals):Trapezius is hypertonic.  Pulse is overall wiry. Tongue is red at tip.    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

## 2025-05-01 DIAGNOSIS — E66.9 OBESITY (BMI 30-39.9): Primary | ICD-10-CM

## 2025-05-01 RX ORDER — TIRZEPATIDE 10 MG/.5ML
10 INJECTION, SOLUTION SUBCUTANEOUS WEEKLY
Qty: 2 ML | Refills: 1 | Status: SHIPPED | OUTPATIENT
Start: 2025-05-01

## 2025-05-05 ENCOUNTER — OFFICE VISIT (OUTPATIENT)
Dept: INTEGRATIVE MEDICINE | Facility: CLINIC | Age: 60
End: 2025-05-05
Payer: COMMERCIAL

## 2025-05-05 DIAGNOSIS — G47.00 INSOMNIA, UNSPECIFIED TYPE: Primary | ICD-10-CM

## 2025-05-05 PROCEDURE — 97810 ACUP 1/> WO ESTIM 1ST 15 MIN: CPT | Performed by: ACUPUNCTURIST

## 2025-05-05 PROCEDURE — 97811 ACUP 1/> W/O ESTIM EA ADD 15: CPT | Performed by: ACUPUNCTURIST

## 2025-05-05 NOTE — PROGRESS NOTES
Alda Hartley is a 60 year old female Acupuncture Therapy.   Chief Complaint: Chronic body pain  Secondary Complaint: Insomnia  Tertiary Complaint: RT shoulder pain    Self reported health status:     Patient reported severity of symptom(s) over the last 24 hours  With zero reporting no symptoms and 10 reporting the worst severity    Symptom 1: 2  Symptom 2: 2  Symptom 3: 2    Response to last TX: Alda has noticed a change in her insomnia and slept through the night after the last treatment and has noticed that she is in general sleeping better, but still not sufficient.    Body pain has overall improved and is only rated a 2/10 today.    RT shoulder pain improved significantly and was only rated 2/10 today.    HPI 4/18/25: Alda suffers from chronic body pain of unknown origin which mainly affects her joints.  She thinks it may correspond to beginning the weight loss drug Zepbound. Has lost 20lbs.    Additionally she suffers from insomnia and poor sleep.  She has been diagnosed with sleep apnea and uses a CPAP machine. She tends to fall asleep watching TV as we discussed the importance of good sleep hygiene.    She has been diagnosed with hypertension and takes medication.  She would like to get off of that medication.  Cholesterol is high and she takes statins.    We discussed the importance of an ant-inflammatory diet and she will try to incorporate.    Works at [employer] as .    Objective (Pulse, Tongue, Vitals):Trapezius is hypertonic.  Pulse is overall wiry. Tongue is red at tip.    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

## 2025-05-13 ENCOUNTER — OFFICE VISIT (OUTPATIENT)
Dept: DERMATOLOGY CLINIC | Facility: CLINIC | Age: 60
End: 2025-05-13
Payer: COMMERCIAL

## 2025-05-13 DIAGNOSIS — D48.5 NEOPLASM OF UNCERTAIN BEHAVIOR OF SKIN: Primary | ICD-10-CM

## 2025-05-13 PROCEDURE — 11102 TANGNTL BX SKIN SINGLE LES: CPT | Performed by: PHYSICIAN ASSISTANT

## 2025-05-13 PROCEDURE — 88305 TISSUE EXAM BY PATHOLOGIST: CPT | Performed by: PHYSICIAN ASSISTANT

## 2025-05-13 NOTE — PROCEDURES
Procedure: Shave biopsy     With the patient is a supine position, the skin was scrubbed with alcohol.  Anesthesia was obtained by injecting 2 mL of 1% Xylocaine with Epinephrine. The skin surrounding the lesion on upper left back was placed under tension and the lesion was incised using a dermablade. The specimen was sent for histopathological examination.  Hemostasis was obtained with cautery and/or aluminum chloride.  The biopsy site was dressed with bandaid and petroleum jelly.     The estimated blood loss was < 1mL.     Clinical Dx & Size of lesion(s):    Lesion 1 - SCC vs eczema- size: 5 mm     Alda tolerated the procedure well.  Complications:  none

## 2025-05-13 NOTE — PROGRESS NOTES
HPI:    Patient ID: Alda Hartley is a 60 year old female.    Patient presents for lesion on the back. Has been very itchy. Red and dry onoing for the past 1 month. Denies product changes. No current treatments. No draining or tenderness noted. No allergies to medications noted.         Review of Systems   Constitutional:  Negative for chills and fever.   Musculoskeletal:  Negative for arthralgias and myalgias.   Skin:  Positive for rash. Negative for color change and wound.          Current Medications[1]  Allergies:Allergies[2]   There were no vitals taken for this visit.  There is no height or weight on file to calculate BMI.  PHYSICAL EXAM:   Physical Exam  Constitutional:       General: She is not in acute distress.     Appearance: Normal appearance.   Skin:     General: Skin is warm and dry.      Findings: Rash present.      Comments: Erythematous scaling area noted on the back. No draining or tenderness noted. Raised lesion on the back.    Neurological:      Mental Status: She is alert and oriented to person, place, and time.                ASSESSMENT/PLAN:   1. Neoplasm of uncertain behavior of skin  -After discussion with patient, advised the following:  -Performed shave biopsy  -Sent to pathology  -Will call with results.   -Will base treatment on results.   -Care instructions given.   -To call or follow-up with worsening symptoms or concerns.   -Pt was agreeable to plan and will comply with discussion above.     - Specimen to Pathology Tissue; Future      Orders Placed This Encounter   Procedures    Specimen to Pathology Tissue       Meds This Visit:  Requested Prescriptions      No prescriptions requested or ordered in this encounter       Imaging & Referrals:  None         ID#[id number]       [1]   Current Outpatient Medications   Medication Sig Dispense Refill    Tirzepatide-Weight Management (ZEPBOUND) 10 MG/0.5ML Subcutaneous Solution Auto-injector Inject 10 mg into the skin once a week. 2 mL 1     Cholecalciferol (VITAMIN D) 50 MCG (2000 UT) Oral Tab Take 1 tablet by mouth daily.      Magnesium 200 MG Oral Tab Take 2 tablets (400 mg total) by mouth at bedtime.      psyllium Oral Powd Pack Take by mouth daily. 2 table spoons in the morning      lisinopril 30 MG Oral Tab Take 1 tablet (30 mg total) by mouth daily. FOR BLOOD PRESSURE. 90 tablet 1    rosuvastatin 20 MG Oral Tab Take 1 tablet (20 mg total) by mouth nightly. FOR CHOLESTEROL. 90 tablet 1    Probiotic Product (HEALTHY COLON OR) Take by mouth.      Tirzepatide-Weight Management (ZEPBOUND) 7.5 MG/0.5ML Subcutaneous Solution Auto-injector Inject 7.5 mg into the skin once a week. (Patient not taking: Reported on 5/13/2025) 2 mL 1    ZEPBOUND 5 MG/0.5ML Subcutaneous Solution Auto-injector INJECT 5 MG INTO THE SKIN ONCE WEEKLY 2 mL 0   [2] No Known Allergies

## 2025-05-16 ENCOUNTER — OFFICE VISIT (OUTPATIENT)
Dept: INTEGRATIVE MEDICINE | Facility: CLINIC | Age: 60
End: 2025-05-16
Payer: COMMERCIAL

## 2025-05-16 DIAGNOSIS — G47.00 INSOMNIA, UNSPECIFIED TYPE: Primary | ICD-10-CM

## 2025-05-16 PROCEDURE — 97811 ACUP 1/> W/O ESTIM EA ADD 15: CPT | Performed by: ACUPUNCTURIST

## 2025-05-16 PROCEDURE — 97810 ACUP 1/> WO ESTIM 1ST 15 MIN: CPT | Performed by: ACUPUNCTURIST

## 2025-05-16 NOTE — PROGRESS NOTES
Alda Hartley is a 60 year old female Acupuncture Therapy.   Chief Complaint: Chronic body pain  Secondary Complaint: Insomnia  Tertiary Complaint: RT shoulder pain    Self reported health status:     Patient reported severity of symptom(s) over the last 24 hours  With zero reporting no symptoms and 10 reporting the worst severity    Symptom 1: 2  Symptom 2: 2-3  Symptom 3: 3-4    Response to last TX: Alda has noticed an overall increase in her sleep which has been better for several days after acupuncture but then will slowly start to get worse.    Body pain has overall improved and is only rated a 2/10 today.    RT shoulder pain improved significantly after last tx but will be aggravated when she sleeps on it which she did last night.    HPI 4/18/25: Alda suffers from chronic body pain of unknown origin which mainly affects her joints.  She thinks it may correspond to beginning the weight loss drug Zepbound. Has lost 20lbs.    Additionally she suffers from insomnia and poor sleep.  She has been diagnosed with sleep apnea and uses a CPAP machine. She tends to fall asleep watching TV as we discussed the importance of good sleep hygiene.    She has been diagnosed with hypertension and takes medication.  She would like to get off of that medication.  Cholesterol is high and she takes statins.    We discussed the importance of an ant-inflammatory diet and she will try to incorporate.    Works at [employer] as .    Objective (Pulse, Tongue, Vitals):Trapezius is hypertonic.  Pulse is overall wiry. Tongue is red at tip.    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

## 2025-05-23 ENCOUNTER — OFFICE VISIT (OUTPATIENT)
Dept: INTEGRATIVE MEDICINE | Facility: CLINIC | Age: 60
End: 2025-05-23
Payer: COMMERCIAL

## 2025-05-23 DIAGNOSIS — G47.00 INSOMNIA, UNSPECIFIED TYPE: Primary | ICD-10-CM

## 2025-05-23 PROCEDURE — 97811 ACUP 1/> W/O ESTIM EA ADD 15: CPT | Performed by: ACUPUNCTURIST

## 2025-05-23 PROCEDURE — 97810 ACUP 1/> WO ESTIM 1ST 15 MIN: CPT | Performed by: ACUPUNCTURIST

## 2025-05-23 NOTE — PROGRESS NOTES
Alda Hartley is a 60 year old female Acupuncture Therapy.   Chief Complaint: Chronic body pain  Secondary Complaint: Insomnia  Tertiary Complaint: RT shoulder pain    Self reported health status:     Patient reported severity of symptom(s) over the last 24 hours  With zero reporting no symptoms and 10 reporting the worst severity    Symptom 1: 2  Symptom 2: 2-3  Symptom 3: 3    Response to last TX: Alda has noticed an overall increase in her sleep which has been better for several days after acupuncture but then will slowly start to get worse which was the same last tx.    Body pain has overall improved and is only rated a 2/10 today.    RT shoulder pain improved significantly after last 2 tx but will be aggravated when she sleeps on it which she did last night.    HPI 4/18/25: Alda suffers from chronic body pain of unknown origin which mainly affects her joints.  She thinks it may correspond to beginning the weight loss drug Zepbound. Has lost 20lbs.    Additionally she suffers from insomnia and poor sleep.  She has been diagnosed with sleep apnea and uses a CPAP machine. She tends to fall asleep watching TV as we discussed the importance of good sleep hygiene.    She has been diagnosed with hypertension and takes medication.  She would like to get off of that medication.  Cholesterol is high and she takes statins.    We discussed the importance of an ant-inflammatory diet and she will try to incorporate.    Works at [employer] as .    Objective (Pulse, Tongue, Vitals):Trapezius is hypertonic.  Pulse is overall wiry. Tongue is red at tip.    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

## 2025-05-29 ENCOUNTER — OFFICE VISIT (OUTPATIENT)
Dept: INTEGRATIVE MEDICINE | Facility: CLINIC | Age: 60
End: 2025-05-29
Payer: COMMERCIAL

## 2025-05-29 DIAGNOSIS — G47.00 INSOMNIA, UNSPECIFIED TYPE: Primary | ICD-10-CM

## 2025-05-29 PROCEDURE — 97810 ACUP 1/> WO ESTIM 1ST 15 MIN: CPT | Performed by: ACUPUNCTURIST

## 2025-05-29 PROCEDURE — 97811 ACUP 1/> W/O ESTIM EA ADD 15: CPT | Performed by: ACUPUNCTURIST

## 2025-05-29 NOTE — PROGRESS NOTES
Alda Hartley is a 60 year old female Acupuncture Therapy.   Chief Complaint: Insomnia  Secondary Complaint: Chronic body pain  Tertiary Complaint: RT shoulder pain    Self reported health status:     Patient reported severity of symptom(s) over the last 24 hours  With zero reporting no symptoms and 10 reporting the worst severity    Symptom 1: 2  Symptom 2: 2  Symptom 3: 2    Response to last TX: Alda has noticed an overall increase in her sleep.  In general she is sleeping deeper and longer.  However, her sleep apnea scores have not changes significantly.    Body pain has overall improved and is only rated a 2/10 today.    RT shoulder pain improved significantly after last 3 tx but will be aggravated when she sleeps on it..    HPI 4/18/25: Alda suffers from chronic body pain of unknown origin which mainly affects her joints.  She thinks it may correspond to beginning the weight loss drug Zepbound. Has lost 20lbs.    Additionally she suffers from insomnia and poor sleep.  She has been diagnosed with sleep apnea and uses a CPAP machine. She tends to fall asleep watching TV as we discussed the importance of good sleep hygiene.    She has been diagnosed with hypertension and takes medication.  She would like to get off of that medication.  Cholesterol is high and she takes statins.    We discussed the importance of an ant-inflammatory diet and she will try to incorporate.    Works at [employer] as .    Objective (Pulse, Tongue, Vitals):Trapezius is hypertonic.  Pulse is overall wiry. Tongue is red at tip.    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

## 2025-06-03 ENCOUNTER — OFFICE VISIT (OUTPATIENT)
Dept: UROLOGY | Facility: HOSPITAL | Age: 60
End: 2025-06-03
Attending: OBSTETRICS & GYNECOLOGY
Payer: COMMERCIAL

## 2025-06-03 VITALS — HEIGHT: 61.2 IN | WEIGHT: 150 LBS | RESPIRATION RATE: 16 BRPM | BODY MASS INDEX: 28.32 KG/M2

## 2025-06-03 DIAGNOSIS — R35.0 FREQUENCY OF MICTURITION: Primary | ICD-10-CM

## 2025-06-03 DIAGNOSIS — N95.2 VAGINAL ATROPHY: ICD-10-CM

## 2025-06-03 DIAGNOSIS — R35.1 NOCTURIA: ICD-10-CM

## 2025-06-03 LAB
BLOOD URINE: NEGATIVE
CONTROL RUN WITHIN 24 HOURS?: YES
LEUKOCYTE ESTERASE URINE: NEGATIVE
NITRITE URINE: NEGATIVE

## 2025-06-03 PROCEDURE — 99212 OFFICE O/P EST SF 10 MIN: CPT

## 2025-06-03 PROCEDURE — 81002 URINALYSIS NONAUTO W/O SCOPE: CPT | Performed by: OBSTETRICS & GYNECOLOGY

## 2025-06-03 PROCEDURE — 87086 URINE CULTURE/COLONY COUNT: CPT | Performed by: OBSTETRICS & GYNECOLOGY

## 2025-06-03 PROCEDURE — 51701 INSERT BLADDER CATHETER: CPT | Performed by: OBSTETRICS & GYNECOLOGY

## 2025-06-03 RX ORDER — ESTRADIOL 0.1 MG/G
CREAM VAGINAL
Qty: 42.5 G | Refills: 3 | Status: SHIPPED | OUTPATIENT
Start: 2025-06-03

## 2025-06-03 NOTE — PROGRESS NOTES
ID: Alda Hartley  : 3/16/1965  Date: 6/3/2025     Chief Complaint   Patient presents with    Prolapse       HPI:  60 year old female, G2 Vaginal deliveries s2, who presents for symptoms of vaginal bulge x 1 week.    Voids every 1 hours during the day and x 2 at night.  Has rare UUI and NELSON  Saw Dr. Tate in the past for bladder symptoms.  Referred to pelvic floor PT, but did not pursue.   Has history of UTIs in the past, but not recently.  Follows up with Dr. Valdez with Gyn.  Uses NTF suppression after intercourse.  Has a uterus. Not on estrace.   Bowels are regular.    PMHx: DIANA, HTN, BMI of 28.     Urogynecology Summary:  Urogynecology Summary  Prolapse: Yes (25)  NELSON: Yes (rare with jumping,sneezing coughing)  Nocturia Frequency: 2  Frequency: Less than 1 hour  Incomplete emptying: No  Constipation: No  Wears pad day?: 0  Activities are limited by UI/POP?: No          HISTORY:  Past Medical History[1]   Past Surgical History[2]   Family History[3]   Short Social Hx on File[4]     Allergies:  Allergies[5]    Medications:  Medications Prior to Visit[6]    Review of Systems:    A comprehensive 12 point review of systems was completed.  Pertinent positives noted in the the HPI.  Denies CP  Denies SOB    Vitals:  Resp 16   Ht 61.2\"   Wt 150 lb (68 kg)   BMI 28.16 kg/m²        GENERAL EXAM:  GENERAL:  Alert and oriented. Well-nourished, normally developed.  Thought and emotional status are appropriate, speech is understandable.  No acute distress.   HEAD: Normocephalic and atraumatic with normal hair distribution  LUNGS:  Normal respiratory effort.    ABDOMEN: Non tender to palpation, tone normal without rigidity or guarding, no masses present, no evidence of hernia.   EXTREMITIES:  Without edema, varicosities or lesions.   SKIN:  Warm and dry, with good color and turgor. No lesions.    PELVIC EXAM: The patient consented verbally to the pelvic exam and procedures as needed. The chaperone present  and witnessing exam was VICENTE Nickerson  External Genitalia: Normal appearance for age. + atrophy, no lesions  Urethra: + atrophy, non tender  Bladder:no fullness, non tender  Vagina: + atrophy, no lesions   Cervix: no bleeding, no lesions, non tender  Uterus: soft, mobile, non tender  Adnexa:no masses, non tender  Perineum: non tender  Anus: no hemorrhoids  Rectum: deferred.     PELVIS FLOOR NEUROMUSCULAR FUNCTION:  Strength:  3  Perineal Sensation:  Normal      PELVIC SUPPORT:  Reyno:  1  Ant:  1  Post:  1  CST:  negative  UVJ: + hypermobile      The patient voided 300 ml in the privacy of the bathroom.  She was catheterized utilizing sterile technique and a 14 [place] straight catheter for PVR of 50 ml.  Urine dip negative. Specimen sent for C&S.     Impression/Plan:    ICD-10-CM    1. Frequency of micturition  R35.0       2. Nocturia  R35.1 POCT urinalysis dipstick[41593]     Urine Culture, Routine     Straight Cath PVR      3. Vaginal atrophy  N95.2 estradiol (ESTRACE) 0.1 MG/GM Vaginal Cream          Discussion Items:   Nonsurgical and surgical treatments for POP  Pelvic muscle rehabilitation including pelvic floor PT  Topical estrogen therapy for treating UGA  Reassured regarding the absence of significant prolapse on exam.  Mirror utilized to demonstrate exam findings.   Discussed dietary and behavioral modification, discussed pharmacologic and nonpharmacologic mgmt options for urinary symptoms. Discussed dietary & weight management with potential improvements in symptoms with weight loss.    Diagnostic Items:  Urine C&S    Medications Discussed:  Estrace vaginal cream 1 gram per vagina x 3 per week.     Treatment Plan, Non-surgical:   Declined referral to pelvic floor PT. Will try to do Kegels on her own.    Treatment Plan, Surgical:   None at this time.     Pt verbalizes understanding of all above discussed information. Follow up in 3 months or sooner prn.     Zamzam Jimenez MD, FACOG, FACS  Urogynecology and  Reconstructive Pelvic Surgery            [1]   Past Medical History:   Agatston CAC score, <100        Essential hypertension    Hyperlipidemia    Hypertension    Obesity    Obesity (BMI 30-39.9)    Sleep apnea    Snoring   [2]   Past Surgical History:  Procedure Laterality Date    Colonoscopy      2022 repeat 5 yrs benign polyp    Colonoscopy  2022    Incision and drainage  2016    Lasik          Victor M biopsy stereo nodule 2 site bilat (cpt=19081/58008)          Needle biopsy left        08/10/1996 and 1998    Normal    Other surgical history      Breast Reduction    Reduction left      1982    Reduction right         [3]   Family History  Problem Relation Age of Onset    Other (Other) Mother         copd, GERD    Hypertension Mother     Other (Other) Father     Hypertension Brother     Hypertension Brother     No Known Problems Daughter     Breast Cancer Neg     Ovarian Cancer Neg     Prostate Cancer Neg     Pancreatic Cancer Neg    [4]   Social History  Socioeconomic History    Marital status:    Tobacco Use    Smoking status: Never     Passive exposure: Never    Smokeless tobacco: Never   Vaping Use    Vaping status: Never Used   Substance and Sexual Activity    Alcohol use: Yes     Comment: Socially, couple times a week    Drug use: Never    Sexual activity: Yes   Other Topics Concern    Caffeine Concern Yes     Comment: Coffee, 2 cups per day     Grew up on a farm No    History of tanning Yes    Outdoor occupation No    Breast feeding No    Reaction to local anesthetic No    Pt has a pacemaker No    Pt has a defibrillator No   [5] No Known Allergies  [6]   Outpatient Medications Prior to Visit   Medication Sig Dispense Refill    Tirzepatide-Weight Management (ZEPBOUND) 10 MG/0.5ML Subcutaneous Solution Auto-injector Inject 10 mg into the skin once a week. 2 mL 1    Cholecalciferol (VITAMIN D) 50 MCG ( UT) Oral Tab Take 1 tablet by mouth in the morning.       Magnesium 200 MG Oral Tab Take 2 tablets (400 mg total) by mouth at bedtime.      psyllium Oral Powd Pack Take by mouth in the morning. 2 table spoons in the morning.      lisinopril 30 MG Oral Tab Take 1 tablet (30 mg total) by mouth daily. FOR BLOOD PRESSURE. 90 tablet 1    rosuvastatin 20 MG Oral Tab Take 1 tablet (20 mg total) by mouth nightly. FOR CHOLESTEROL. 90 tablet 1    Probiotic Product (HEALTHY COLON OR) Take by mouth.      Tirzepatide-Weight Management (ZEPBOUND) 7.5 MG/0.5ML Subcutaneous Solution Auto-injector Inject 7.5 mg into the skin once a week. (Patient not taking: Reported on 5/13/2025) 2 mL 1    ZEPBOUND 5 MG/0.5ML Subcutaneous Solution Auto-injector INJECT 5 MG INTO THE SKIN ONCE WEEKLY 2 mL 0     No facility-administered medications prior to visit.

## 2025-06-06 ENCOUNTER — OFFICE VISIT (OUTPATIENT)
Dept: INTEGRATIVE MEDICINE | Facility: CLINIC | Age: 60
End: 2025-06-06
Payer: COMMERCIAL

## 2025-06-06 DIAGNOSIS — G47.00 INSOMNIA, UNSPECIFIED TYPE: Primary | ICD-10-CM

## 2025-06-06 PROCEDURE — 97811 ACUP 1/> W/O ESTIM EA ADD 15: CPT | Performed by: ACUPUNCTURIST

## 2025-06-06 PROCEDURE — 97810 ACUP 1/> WO ESTIM 1ST 15 MIN: CPT | Performed by: ACUPUNCTURIST

## 2025-06-06 NOTE — PROGRESS NOTES
Alda Hartley is a 60 year old female Acupuncture Therapy.   Chief Complaint: Insomnia  Secondary Complaint: Chronic body pain  Tertiary Complaint: RT shoulder pain    Self reported health status:     Patient reported severity of symptom(s) over the last 24 hours  With zero reporting no symptoms and 10 reporting the worst severity    Symptom 1: 2  Symptom 2: 2  Symptom 3: 2    Response to last TX: Alda has noticed an overall increase in her sleep.  In general she is sleeping deeper and longer.  However, her sleep apnea scores have not changes significantly. She does wake up once per night to urinate.  Was recently seen by a urogynecologist w/o any significant findings; possible slight prolapse of bladder.    Body pain has overall improved and is only rated a 2/10 today.    RT shoulder pain improved significantly after last 4 tx but will be aggravated when she sleeps on it.    HPI 4/18/25: Alda suffers from chronic body pain of unknown origin which mainly affects her joints.  She thinks it may correspond to beginning the weight loss drug Zepbound. Has lost 20lbs.    Additionally she suffers from insomnia and poor sleep.  She has been diagnosed with sleep apnea and uses a CPAP machine. She tends to fall asleep watching TV as we discussed the importance of good sleep hygiene.    She has been diagnosed with hypertension and takes medication.  She would like to get off of that medication.  Cholesterol is high and she takes statins.    We discussed the importance of an ant-inflammatory diet and she will try to incorporate.    Works at [employer] as .    Objective (Pulse, Tongue, Vitals):Trapezius is hypertonic.  Pulse is overall wiry. Tongue is red at tip.    TCM Diagnosis/Assessment: LR qi stagnation with HT sahara hightower    Plan of Care:  Name of Herbal Formula:NA  Dosage:NA  Duration:NA    Acupuncture Therapy:  1st set: Bilateral: Yin meredith, GB 20  2nd set: LT: Gu Ci Er, LKDB, RT GB 41, 34, UB 64, GB  30  3rd set: RT: LT: ST 38, SP 9, 8, 7, LR 3    Patient Goals: Improve sleep and energy, reduce body pain and medications    Face Time: 28 minutes  Total Time: 50 minutes    Plan 1-2 tx per week for 1 month

## 2025-06-09 DIAGNOSIS — I10 PRIMARY HYPERTENSION: ICD-10-CM

## 2025-06-09 DIAGNOSIS — E78.2 MIXED HYPERLIPIDEMIA: ICD-10-CM

## 2025-06-10 RX ORDER — ROSUVASTATIN CALCIUM 20 MG/1
20 TABLET, COATED ORAL NIGHTLY
Qty: 90 TABLET | Refills: 3 | Status: SHIPPED | OUTPATIENT
Start: 2025-06-10

## 2025-06-10 RX ORDER — LISINOPRIL 30 MG/1
30 TABLET ORAL DAILY
Qty: 90 TABLET | Refills: 3 | Status: SHIPPED | OUTPATIENT
Start: 2025-06-10

## 2025-06-13 ENCOUNTER — OFFICE VISIT (OUTPATIENT)
Dept: INTEGRATIVE MEDICINE | Facility: CLINIC | Age: 60
End: 2025-06-13
Payer: COMMERCIAL

## 2025-06-13 DIAGNOSIS — R52 COMPLAINTS OF TOTAL BODY PAIN: Primary | ICD-10-CM

## 2025-06-13 PROCEDURE — 97811 ACUP 1/> W/O ESTIM EA ADD 15: CPT | Performed by: ACUPUNCTURIST

## 2025-06-13 PROCEDURE — 97810 ACUP 1/> WO ESTIM 1ST 15 MIN: CPT | Performed by: ACUPUNCTURIST

## 2025-06-13 NOTE — PROGRESS NOTES
Alda Hartley is a 60 year old female Acupuncture Therapy.   Chief Complaint: Chronic body pain  Secondary Complaint: Insomnia  Tertiary Complaint: RT shoulder pain    Self reported health status:     Patient reported severity of symptom(s) over the last 24 hours  With zero reporting no symptoms and 10 reporting the worst severity    Symptom 1: 0-2  Symptom 2: 2  Symptom 3: 0-3    Response to last TX: Alda has noticed an overall increase in her sleep as she is sleeping deeper and longer.  However, her sleep apnea scores have not changes significantly. She does wake up once per night to urinate.  Was recently seen by a urogynecologist w/o any significant findings; possible slight prolapse of bladder but nothing that requires attention at this time.  May consider Rosina shoemaker meredith in the future.    Body pain has overall improved since beginning acupuncture; she feels sore in the morning but it resolves within an hour or 2.    RT shoulder pain improved significantly after last 5 tx and was not a primary complaint today.    HPI 4/18/25: Alda suffers from chronic body pain of unknown origin which mainly affects her joints.  She thinks it may correspond to beginning the weight loss drug Zepbound. Has lost 20lbs.    Additionally she suffers from insomnia and poor sleep.  She has been diagnosed with sleep apnea and uses a CPAP machine. She tends to fall asleep watching TV as we discussed the importance of good sleep hygiene.    She has been diagnosed with hypertension and takes medication.  She would like to get off of that medication.  Cholesterol is high and she takes statins.    We discussed the importance of an ant-inflammatory diet and she will try to incorporate.    Works at [employer] as .    Objective (Pulse, Tongue, Vitals):Trapezius is hypertonic.  Pulse is overall wiry. Tongue is red at tip.    TCM Diagnosis/Assessment: LR qi stagnation with HT qi campbell    Plan of Care:  Name of Herbal  Formula:NA  Dosage:NA  Duration:NA    Acupuncture Therapy:  1st set: Bilateral: Yin meredith, GB 20  2nd set: Bilateral LI 4-ST 36 estim 4 hz  3rd set: LT LR 8, SP 9, 8, 4, LR 3    Patient Goals: Improve sleep and energy, reduce body pain and medications    Face Time: 28 minutes  Total Time: 50 minutes    Plan 1-2 tx per week for 1 month

## 2025-06-24 DIAGNOSIS — E66.9 OBESITY (BMI 30-39.9): ICD-10-CM

## 2025-06-25 RX ORDER — TIRZEPATIDE 10 MG/.5ML
10 INJECTION, SOLUTION SUBCUTANEOUS WEEKLY
Qty: 2 ML | Refills: 1 | Status: SHIPPED | OUTPATIENT
Start: 2025-06-25

## 2025-07-07 ENCOUNTER — OFFICE VISIT (OUTPATIENT)
Dept: SURGERY | Facility: CLINIC | Age: 60
End: 2025-07-07
Payer: COMMERCIAL

## 2025-07-07 VITALS
DIASTOLIC BLOOD PRESSURE: 86 MMHG | OXYGEN SATURATION: 98 % | BODY MASS INDEX: 26.68 KG/M2 | WEIGHT: 141.31 LBS | HEIGHT: 61.2 IN | HEART RATE: 84 BPM | SYSTOLIC BLOOD PRESSURE: 118 MMHG

## 2025-07-07 DIAGNOSIS — E66.9 OBESITY (BMI 30-39.9): ICD-10-CM

## 2025-07-07 DIAGNOSIS — Z51.81 ENCOUNTER FOR THERAPEUTIC DRUG MONITORING: ICD-10-CM

## 2025-07-07 DIAGNOSIS — E55.9 VITAMIN D DEFICIENCY: ICD-10-CM

## 2025-07-07 DIAGNOSIS — I10 HYPERTENSION, UNSPECIFIED TYPE: ICD-10-CM

## 2025-07-07 DIAGNOSIS — G47.33 OSA ON CPAP: ICD-10-CM

## 2025-07-07 DIAGNOSIS — E78.5 DYSLIPIDEMIA: Primary | ICD-10-CM

## 2025-07-07 PROCEDURE — 99214 OFFICE O/P EST MOD 30 MIN: CPT | Performed by: NURSE PRACTITIONER

## 2025-07-07 NOTE — PROGRESS NOTES
[facility]  [address]  [facility] [address]  Dept: [phone]  Loc: [phone]       Patient:  Alda Hartley  :      3/16/1965  MRN:      [id number]    Chief Complaint:    Chief Complaint   Patient presents with    Follow - Up    Weight Management       SUBJECTIVE     History of Present Illness:  Alda is being seen today for a follow-up for weight management.    Doing well on Zepbound 10 mg weekly.   More fatigue.    24 visit:  Reports weight gain after having children/pregnancies.   Works for [employer].   Lives with , children have moved out.  Lost 25 lbs this past 2022 with the MG diet.     Past Medical History:   Past Medical History:    Agatston CAC score, <100        Essential hypertension    Hyperlipidemia    Hypertension    Obesity    Obesity (BMI 30-39.9)    Sleep apnea    Snoring        Comorbidities:  Back pain-Improvement?  yes, Joint pain-Improvement?  yes, SHELBI-Improvement?  yes and Snoring-Improvement?  yes    OBJECTIVE     Vitals: /86 (BP Location: Left arm, Patient Position: Sitting, Cuff Size: adult)   Pulse 84   Ht 5' 1.2\" (1.554 m)   Wt 141 lb 4.8 oz (64.1 kg)   SpO2 98%   BMI 26.52 kg/m²     Initial weight loss: -09   Total weight loss: -32   Start weight: 173   Zepbound SW: 178     -150 lbs     Wt Readings from Last 3 Encounters:   25 141 lb 4.8 oz (64.1 kg)   25 150 lb (68 kg)   25 150 lb 9.6 oz (68.3 kg)       Patient Medications:    Current Outpatient Medications   Medication Sig Dispense Refill    Tirzepatide-Weight Management (ZEPBOUND) 10 MG/0.5ML Subcutaneous Solution Auto-injector Inject 10 mg into the skin once a week. 2 mL 1    lisinopril 30 MG Oral Tab Take 1 tablet (30 mg total) by mouth daily. FOR BLOOD PRESSURE. 90 tablet 3    estradiol (ESTRACE) 0.1 MG/GM Vaginal Cream Apply 1 gram vaginally 2-3 times per  week. 42.5 g 3    Cholecalciferol (VITAMIN D) 50 MCG (2000) Oral Tab Take 1 tablet by mouth in the morning.      rosuvastatin 20 MG Oral Tab Take 1 tablet (20 mg total) by mouth nightly. FOR CHOLESTEROL. 90 tablet 3    Magnesium 200 MG Oral Tab Take 2 tablets (400 mg total) by mouth at bedtime.      psyllium Oral Powd Pack Take by mouth in the morning. 2 table spoons in the morning.      Probiotic Product (HEALTHY COLON OR) Take by mouth.       Allergies:  Patient has no known allergies.     Social History:  Reviewed    Surgical History:    Past Surgical History:   Procedure Laterality Date    Colonoscopy      2022 repeat 5 yrs benign polyp    Colonoscopy  2022    Incision and drainage  2016    Lasik      2000    Victor M biopsy stereo nodule 2 site bilat (cpt=19081/87313)          Needle biopsy left        08/10/1996 and 1998    Normal    Other surgical history  1982    Breast Reduction    Reduction left      1982    Reduction right           Family History:    Family History   Problem Relation Age of Onset    Other (Other) Mother         copd, GERD    Hypertension Mother     Other (Other) Father     Hypertension Brother     Hypertension Brother     No Known Problems Daughter     Breast Cancer Neg     Ovarian Cancer Neg     Prostate Cancer Neg     Pancreatic Cancer Neg        Food Journal  Reviewed and Discussed:       Patient has a Food Journal?: no   Patient is reading nutrition labels?  yes  Average Caloric Intake:    Average CHO Intake:   Is patient exercising? yes  Type of exercise? BB videos- includes strength     Eating Habits  Patient states the following:  Eats 3 meal(s) per day  Amount of soda consumption per day:    Amount of water (in ounces) per day:    Toughest challenge: evening snacking   Poor sleep- Now on CPAP    Met with Do   Aim for 65 grams protein/day    Nutritional Goals  Eat 3-4 cups of fresh fruits or vegetables daily    Behavior Modifications Reviewed  and Discussed  Eat breakfast, Eat 3 meals per day, Plan meals in advance, Read nutrition labels, Drink 64 oz of water per day, Maintain a daily food journal, Utlize portion control strategies to reduce calorie intake, Identify triggers for eating and manage cues, and Eat slowly and take 20 to 30 minutes to complete each meal    Exercise Goals Reviewed and Discussed    Aim for 150 minutes moderate level exercise weekly with 2-3 days strength training as tolerated    ROS:    Constitutional: negative  Respiratory: negative  Cardiovascular: negative  Gastrointestinal: negative  Musculoskeletal:negative  Neurological: negative  Behavioral/Psych: negative  Endocrine: negative  All other systems were reviewed and are negative    Physical Exam:   General appearance: alert, appears stated age and cooperative  Head: Normocephalic, without obvious abnormality, atraumatic  Lungs: clear to auscultation bilaterally  Heart: S1, S2 normal, no murmur, click, rub or gallop, regular rate and rhythm  Abdomen: soft, non-tender; bowel sounds normal; no masses,  no organomegaly  Extremities: extremities normal, atraumatic, no cyanosis or edema  Pulses: 2+ and symmetric  Skin: Skin color, texture, turgor normal. No rashes or lesions    ASSESSMENT       Encounter Diagnosis(ses):   Encounter Diagnoses   Name Primary?    Dyslipidemia Yes    Hypertension, unspecified type     DIANA on CPAP     Obesity (BMI 30-39.9)     Encounter for therapeutic drug monitoring     Vitamin D deficiency        PLAN     DYSLIPIDEMIA: On statin. Recommend dietary changes and lifestyle modifications as discussed below. Monitor.     Lab Results   Component Value Date/Time    CHOLEST 144 03/08/2025 06:59 AM    LDL 77 03/08/2025 06:59 AM    HDL 42 03/08/2025 06:59 AM    TRIG 142 03/08/2025 06:59 AM    VLDL 22 03/08/2025 06:59 AM    TCHDLRATIO 3.7 03/03/2023 12:52 PM     HYPERTENSION: Blood pressure stable on the above medications. No interval change in antihypertensive  medication.     DIANA: Patient was instructed to continue wearing their CPaP as recommended.     Obesity:  Goals for next month:  1. Keep a food log.  2. Drink 48-64 ounces of non-caloric beverages per day. No fruit juices or regular soda.  3. Aim for 150 minutes moderate level exercise weekly with 2-3 days strength training.  4. Increase fruit and vegetable servings to 5-6 per day.      Has taken Vyvanse in the past.  Liked medication, helped control appetite and energy levels.  Lost 8 lbs.     Denies personal or family hx medullary thyroid CA, endocrine neoplasia syndrome, pancreatitis hx, suicidal ideation. No renal impairment, severe GI disease, diabetes, pancreatitis risks noted.    Continue Zepbound 10 mg weekly.   Patient likes this dose for now.   Increase dose monthly as tolerated.    SQ administration teaching provided to patient.   Discussed risks, benefits, and side effects of medication.   Contraindications for medication discussed at length. Patient states understanding.     Met with RD. Follow up as recommended.   Healthy Plate Method.    Labs done: 3/8/25  CBC, CMP, a1c, cholesterol, Vitamin D, TSH in range.     RTC 4 months.     MADELEINE Abernathy

## 2025-08-20 DIAGNOSIS — E66.9 OBESITY (BMI 30-39.9): Primary | ICD-10-CM

## 2025-08-20 RX ORDER — TIRZEPATIDE 12.5 MG/.5ML
12.5 INJECTION, SOLUTION SUBCUTANEOUS WEEKLY
Qty: 2 ML | Refills: 1 | Status: SHIPPED | OUTPATIENT
Start: 2025-08-20 | End: 2025-09-11

## 2025-08-27 ENCOUNTER — OFFICE VISIT (OUTPATIENT)
Dept: SURGERY | Facility: CLINIC | Age: 60
End: 2025-08-27

## 2025-08-27 ENCOUNTER — PATIENT MESSAGE (OUTPATIENT)
Dept: INTERNAL MEDICINE CLINIC | Facility: CLINIC | Age: 60
End: 2025-08-27

## 2025-08-27 VITALS — HEIGHT: 61.2 IN | BODY MASS INDEX: 25.92 KG/M2 | WEIGHT: 137.31 LBS

## 2025-08-27 DIAGNOSIS — E78.2 MIXED HYPERLIPIDEMIA: Primary | ICD-10-CM

## 2025-08-27 DIAGNOSIS — E66.3 OVERWEIGHT (BMI 25.0-29.9): Primary | ICD-10-CM

## 2025-08-27 PROCEDURE — 97803 MED NUTRITION INDIV SUBSEQ: CPT

## 2025-08-27 PROCEDURE — 0358T BIA WHOLE BODY: CPT

## (undated) DIAGNOSIS — Z11.51 ENCOUNTER FOR SCREENING FOR HUMAN PAPILLOMAVIRUS (HPV): ICD-10-CM

## (undated) DIAGNOSIS — Z01.419 ENCOUNTER FOR GYNECOLOGICAL EXAMINATION (GENERAL) (ROUTINE) WITHOUT ABNORMAL FINDINGS: ICD-10-CM

## (undated) NOTE — MR AVS SNAPSHOT
[facility] [address] [phone]               Thank you for choosing us for your health care visit with Katarina Galvin MD.  We are glad to serve you and happy to provide you with this summary of your v medications prescribed for you. Read the directions carefully, and ask your doctor or other care provider to review them with you.          Where to Get Your Medications      You can get these medications from any pharmacy     Bring a paper prescription for

## (undated) NOTE — MR AVS SNAPSHOT
[facility] [employer] [place] [id number] [phone]               Thank you for choosing us for your health care visit with Kassidy De La Rosa MD.  We are glad to serve you and happy to provide you with this summary of your v If you've recently had a stay at the Hospital you can access your discharge instructions in [facility] by going to Visits < Admission Summaries.  If you've been to the Emergency Department or your doctor's office, you can view your past visit information in My

## (undated) NOTE — MR AVS SNAPSHOT
[place]  [address]  [phone]               Thank you for choosing us for your health care visit with Holger Orr MD.  We are glad to serve you and happy to provide you with this summary insurance company's guidelines for prior authorization for this test.  You may be held responsible for payment in full if proper authorization is not acquired.   Please contact the Patient Business Office at [phone] if you have any questions related to eardrop to use at home to soften the earwax. Use these products only if the provider recommends them. Use these products only if the provider recommends them. Carefully follow the instructions given.   · Don’t use mineral oil or OTC eardrops if you might ha · Foul-smelling fluid draining from the ear  · Swelling, redness, or tenderness of the outer ear  · Headache, neck pain, or stiff neck  Date Last Reviewed: 3/22/2015  © 6695-9489 The [address] [place] [address], [address] [place] [address].  All rig

## (undated) NOTE — MR AVS SNAPSHOT
[address] [phone]               Thank you for choosing us for your health care visit with Azar Tinsley MD.  We are glad to serve you and happy to provide you with this summary of your v Summaries. If you've been to the Emergency Department or your doctor's office, you can view your past visit information in [employer] by going to Visits < Visit Summaries. [employer] questions? Call [phone] for help.   [employer] is NOT to be used for urge

## (undated) NOTE — MR AVS SNAPSHOT
[facility] [address]  [address], [address]  [phone]               Thank you for choosing us for your health care visit with OVIDIO Eng MD.  We are glad to serve you and happy to provide you with this summary of Where to Get Your Medications      These medications were sent to [id number] - [place] - [address] [facility], [phone], [phone]  [address], [address] [facility]     Phone:  [phone] - amoxicillin [email or web address]

## (undated) NOTE — MR AVS SNAPSHOT
[address] [facility]  [address] [facility] [phone]               Thank you for choosing us for your health care visit with Katarina Galvin MD.  We are glad to serve you and happy to provide you with th discharge instructions in [email or web address] by going to Visits < Admission Summaries. If you've been to the Emergency Department or your doctor's office, you can view your past visit information in [email or web address] by going to Visits < Visit Summaries. [employer] questions?